# Patient Record
Sex: FEMALE | Race: WHITE | NOT HISPANIC OR LATINO
[De-identification: names, ages, dates, MRNs, and addresses within clinical notes are randomized per-mention and may not be internally consistent; named-entity substitution may affect disease eponyms.]

---

## 2018-03-23 ENCOUNTER — APPOINTMENT (OUTPATIENT)
Dept: GYNECOLOGIC ONCOLOGY | Facility: CLINIC | Age: 63
End: 2018-03-23
Payer: COMMERCIAL

## 2018-03-23 VITALS
SYSTOLIC BLOOD PRESSURE: 146 MMHG | WEIGHT: 175.5 LBS | HEIGHT: 56 IN | HEART RATE: 95 BPM | DIASTOLIC BLOOD PRESSURE: 99 MMHG | BODY MASS INDEX: 39.48 KG/M2 | OXYGEN SATURATION: 96 %

## 2018-03-23 DIAGNOSIS — M32.9 SYSTEMIC LUPUS ERYTHEMATOSUS, UNSPECIFIED: ICD-10-CM

## 2018-03-23 DIAGNOSIS — Z83.3 FAMILY HISTORY OF DIABETES MELLITUS: ICD-10-CM

## 2018-03-23 DIAGNOSIS — G43.909 MIGRAINE, UNSPECIFIED, NOT INTRACTABLE, W/OUT STATUS MIGRAINOSUS: ICD-10-CM

## 2018-03-23 DIAGNOSIS — Z82.0 FAMILY HISTORY OF EPILEPSY AND OTHER DISEASES OF THE NERVOUS SYSTEM: ICD-10-CM

## 2018-03-23 PROCEDURE — 99205 OFFICE O/P NEW HI 60 MIN: CPT

## 2018-03-28 ENCOUNTER — OUTPATIENT (OUTPATIENT)
Dept: OUTPATIENT SERVICES | Facility: HOSPITAL | Age: 63
LOS: 1 days | End: 2018-03-28
Payer: COMMERCIAL

## 2018-03-28 ENCOUNTER — RESULT REVIEW (OUTPATIENT)
Age: 63
End: 2018-03-28

## 2018-03-28 DIAGNOSIS — C51.9 MALIGNANT NEOPLASM OF VULVA, UNSPECIFIED: ICD-10-CM

## 2018-03-28 LAB — SURGICAL PATHOLOGY STUDY: SIGNIFICANT CHANGE UP

## 2018-03-28 PROCEDURE — 88321 CONSLTJ&REPRT SLD PREP ELSWR: CPT

## 2018-03-29 ENCOUNTER — APPOINTMENT (OUTPATIENT)
Dept: GYNECOLOGIC ONCOLOGY | Facility: CLINIC | Age: 63
End: 2018-03-29
Payer: COMMERCIAL

## 2018-03-29 VITALS
OXYGEN SATURATION: 97 % | HEIGHT: 56 IN | WEIGHT: 176.13 LBS | SYSTOLIC BLOOD PRESSURE: 146 MMHG | HEART RATE: 110 BPM | BODY MASS INDEX: 39.62 KG/M2 | DIASTOLIC BLOOD PRESSURE: 88 MMHG

## 2018-03-29 PROCEDURE — 99214 OFFICE O/P EST MOD 30 MIN: CPT

## 2018-04-03 ENCOUNTER — OUTPATIENT (OUTPATIENT)
Dept: OUTPATIENT SERVICES | Facility: HOSPITAL | Age: 63
LOS: 1 days | End: 2018-04-03
Payer: COMMERCIAL

## 2018-04-03 LAB — GLUCOSE BLDC GLUCOMTR-MCNC: 98 MG/DL — SIGNIFICANT CHANGE UP (ref 70–99)

## 2018-04-03 PROCEDURE — 82962 GLUCOSE BLOOD TEST: CPT

## 2018-04-03 PROCEDURE — 78815 PET IMAGE W/CT SKULL-THIGH: CPT | Mod: 26

## 2018-04-03 PROCEDURE — 78815 PET IMAGE W/CT SKULL-THIGH: CPT

## 2018-04-03 PROCEDURE — A9552: CPT

## 2018-04-06 RX ORDER — IBUPROFEN 800 MG/1
800 TABLET, FILM COATED ORAL 3 TIMES DAILY
Qty: 30 | Refills: 0 | Status: ACTIVE | COMMUNITY
Start: 2018-04-06 | End: 1900-01-01

## 2018-04-06 RX ORDER — DOCUSATE SODIUM 100 MG/1
100 CAPSULE ORAL
Qty: 60 | Refills: 0 | Status: ACTIVE | COMMUNITY
Start: 2018-04-06 | End: 1900-01-01

## 2018-04-09 ENCOUNTER — OUTPATIENT (OUTPATIENT)
Dept: OUTPATIENT SERVICES | Facility: HOSPITAL | Age: 63
LOS: 1 days | End: 2018-04-09
Payer: COMMERCIAL

## 2018-04-09 VITALS
SYSTOLIC BLOOD PRESSURE: 150 MMHG | RESPIRATION RATE: 18 BRPM | HEART RATE: 73 BPM | OXYGEN SATURATION: 98 % | WEIGHT: 173.06 LBS | TEMPERATURE: 97 F | HEIGHT: 59 IN | DIASTOLIC BLOOD PRESSURE: 68 MMHG

## 2018-04-09 DIAGNOSIS — Z98.891 HISTORY OF UTERINE SCAR FROM PREVIOUS SURGERY: Chronic | ICD-10-CM

## 2018-04-09 PROCEDURE — 78195 LYMPH SYSTEM IMAGING: CPT | Mod: 26

## 2018-04-09 PROCEDURE — 78195 LYMPH SYSTEM IMAGING: CPT

## 2018-04-09 PROCEDURE — A9541: CPT

## 2018-04-10 ENCOUNTER — RESULT REVIEW (OUTPATIENT)
Age: 63
End: 2018-04-10

## 2018-04-10 ENCOUNTER — APPOINTMENT (OUTPATIENT)
Dept: GYNECOLOGIC ONCOLOGY | Facility: HOSPITAL | Age: 63
End: 2018-04-10

## 2018-04-10 ENCOUNTER — INPATIENT (INPATIENT)
Facility: HOSPITAL | Age: 63
LOS: 2 days | Discharge: HOME HEALTH | DRG: 747 | End: 2018-04-13
Attending: OBSTETRICS & GYNECOLOGY | Admitting: OBSTETRICS & GYNECOLOGY
Payer: COMMERCIAL

## 2018-04-10 ENCOUNTER — TRANSCRIPTION ENCOUNTER (OUTPATIENT)
Age: 63
End: 2018-04-10

## 2018-04-10 DIAGNOSIS — Z98.891 HISTORY OF UTERINE SCAR FROM PREVIOUS SURGERY: Chronic | ICD-10-CM

## 2018-04-10 LAB
BLD GP AB SCN SERPL QL: NEGATIVE — SIGNIFICANT CHANGE UP
RH IG SCN BLD-IMP: POSITIVE — SIGNIFICANT CHANGE UP

## 2018-04-10 PROCEDURE — 38500 BIOPSY/REMOVAL LYMPH NODES: CPT

## 2018-04-10 PROCEDURE — 56630 VULVECTOMY RADICAL PARTIAL: CPT

## 2018-04-10 PROCEDURE — 38900 IO MAP OF SENT LYMPH NODE: CPT

## 2018-04-10 RX ORDER — OXYCODONE HYDROCHLORIDE 5 MG/1
5 TABLET ORAL EVERY 4 HOURS
Qty: 0 | Refills: 0 | Status: DISCONTINUED | OUTPATIENT
Start: 2018-04-10 | End: 2018-04-13

## 2018-04-10 RX ORDER — ENOXAPARIN SODIUM 100 MG/ML
40 INJECTION SUBCUTANEOUS DAILY
Qty: 0 | Refills: 0 | Status: DISCONTINUED | OUTPATIENT
Start: 2018-04-11 | End: 2018-04-11

## 2018-04-10 RX ORDER — ACETAMINOPHEN 500 MG
1000 TABLET ORAL EVERY 8 HOURS
Qty: 0 | Refills: 0 | Status: COMPLETED | OUTPATIENT
Start: 2018-04-10 | End: 2018-04-13

## 2018-04-10 RX ORDER — ACETAMINOPHEN 500 MG
650 TABLET ORAL ONCE
Qty: 0 | Refills: 0 | Status: DISCONTINUED | OUTPATIENT
Start: 2018-04-10 | End: 2018-04-10

## 2018-04-10 RX ORDER — OXYCODONE HYDROCHLORIDE 5 MG/1
5 TABLET ORAL EVERY 4 HOURS
Qty: 0 | Refills: 0 | Status: DISCONTINUED | OUTPATIENT
Start: 2018-04-10 | End: 2018-04-10

## 2018-04-10 RX ORDER — OXYCODONE AND ACETAMINOPHEN 5; 325 MG/1; MG/1
1 TABLET ORAL EVERY 4 HOURS
Qty: 0 | Refills: 0 | Status: DISCONTINUED | OUTPATIENT
Start: 2018-04-10 | End: 2018-04-10

## 2018-04-10 RX ORDER — DOCUSATE SODIUM 100 MG
100 CAPSULE ORAL THREE TIMES A DAY
Qty: 0 | Refills: 0 | Status: DISCONTINUED | OUTPATIENT
Start: 2018-04-10 | End: 2018-04-13

## 2018-04-10 RX ORDER — ONDANSETRON 8 MG/1
4 TABLET, FILM COATED ORAL ONCE
Qty: 0 | Refills: 0 | Status: DISCONTINUED | OUTPATIENT
Start: 2018-04-10 | End: 2018-04-10

## 2018-04-10 RX ORDER — ONDANSETRON 8 MG/1
8 TABLET, FILM COATED ORAL EVERY 8 HOURS
Qty: 0 | Refills: 0 | Status: DISCONTINUED | OUTPATIENT
Start: 2018-04-10 | End: 2018-04-13

## 2018-04-10 RX ORDER — ONDANSETRON 8 MG/1
4 TABLET, FILM COATED ORAL ONCE
Qty: 0 | Refills: 0 | Status: COMPLETED | OUTPATIENT
Start: 2018-04-10 | End: 2018-04-10

## 2018-04-10 RX ORDER — SODIUM CHLORIDE 9 MG/ML
1000 INJECTION, SOLUTION INTRAVENOUS
Qty: 0 | Refills: 0 | Status: DISCONTINUED | OUTPATIENT
Start: 2018-04-10 | End: 2018-04-10

## 2018-04-10 RX ORDER — KETOROLAC TROMETHAMINE 30 MG/ML
30 SYRINGE (ML) INJECTION EVERY 8 HOURS
Qty: 0 | Refills: 0 | Status: DISCONTINUED | OUTPATIENT
Start: 2018-04-10 | End: 2018-04-13

## 2018-04-10 RX ORDER — ACETAMINOPHEN 500 MG
1000 TABLET ORAL EVERY 8 HOURS
Qty: 0 | Refills: 0 | Status: DISCONTINUED | OUTPATIENT
Start: 2018-04-10 | End: 2018-04-10

## 2018-04-10 RX ORDER — KETOROLAC TROMETHAMINE 30 MG/ML
30 SYRINGE (ML) INJECTION ONCE
Qty: 0 | Refills: 0 | Status: DISCONTINUED | OUTPATIENT
Start: 2018-04-10 | End: 2018-04-10

## 2018-04-10 RX ORDER — METOCLOPRAMIDE HCL 10 MG
10 TABLET ORAL ONCE
Qty: 0 | Refills: 0 | Status: COMPLETED | OUTPATIENT
Start: 2018-04-10 | End: 2018-04-10

## 2018-04-10 RX ORDER — OXYCODONE AND ACETAMINOPHEN 5; 325 MG/1; MG/1
2 TABLET ORAL EVERY 6 HOURS
Qty: 0 | Refills: 0 | Status: DISCONTINUED | OUTPATIENT
Start: 2018-04-10 | End: 2018-04-10

## 2018-04-10 RX ORDER — HYDROMORPHONE HYDROCHLORIDE 2 MG/ML
0.5 INJECTION INTRAMUSCULAR; INTRAVENOUS; SUBCUTANEOUS ONCE
Qty: 0 | Refills: 0 | Status: DISCONTINUED | OUTPATIENT
Start: 2018-04-10 | End: 2018-04-10

## 2018-04-10 RX ORDER — OXYCODONE HYDROCHLORIDE 5 MG/1
10 TABLET ORAL EVERY 6 HOURS
Qty: 0 | Refills: 0 | Status: DISCONTINUED | OUTPATIENT
Start: 2018-04-10 | End: 2018-04-10

## 2018-04-10 RX ADMIN — HYDROMORPHONE HYDROCHLORIDE 0.5 MILLIGRAM(S): 2 INJECTION INTRAMUSCULAR; INTRAVENOUS; SUBCUTANEOUS at 14:27

## 2018-04-10 RX ADMIN — Medication 100 MILLIGRAM(S): at 20:55

## 2018-04-10 RX ADMIN — Medication 10 MILLIGRAM(S): at 14:27

## 2018-04-10 RX ADMIN — ONDANSETRON 4 MILLIGRAM(S): 8 TABLET, FILM COATED ORAL at 18:28

## 2018-04-10 RX ADMIN — Medication 30 MILLIGRAM(S): at 12:20

## 2018-04-10 RX ADMIN — Medication 1000 MILLIGRAM(S): at 17:39

## 2018-04-10 RX ADMIN — Medication 1000 MILLIGRAM(S): at 18:30

## 2018-04-10 RX ADMIN — OXYCODONE AND ACETAMINOPHEN 2 TABLET(S): 5; 325 TABLET ORAL at 13:20

## 2018-04-10 RX ADMIN — Medication 30 MILLIGRAM(S): at 21:10

## 2018-04-10 RX ADMIN — ONDANSETRON 8 MILLIGRAM(S): 8 TABLET, FILM COATED ORAL at 20:55

## 2018-04-10 RX ADMIN — Medication 30 MILLIGRAM(S): at 20:55

## 2018-04-10 NOTE — PROGRESS NOTE ADULT - SUBJECTIVE AND OBJECTIVE BOX
Patient evaluated at bedside. Pt notes pressure and swelling in vaginal/perineal area but denies any vaginal bleeding. Pt endorses nausea and an episode of emesis after having some water. Dnies nausea currently. Has not passed flatus as of yet. She denies headache, dizziness, chest pain, palpitations, shortness of breathe,or heavy vaginal bleeding.    Physical Exam:  Vital Signs Last 24 Hrs  T(C): 36.3 (10 Apr 2018 20:39), Max: 36.6 (10 Apr 2018 11:55)  T(F): 97.3 (10 Apr 2018 20:39), Max: 97.8 (10 Apr 2018 11:55)  HR: 91 (10 Apr 2018 20:39) (68 - 102)  BP: 113/74 (10 Apr 2018 20:39) (98/54 - 146/92)  BP(mean): 74 (10 Apr 2018 14:40) (74 - 113)  RR: 16 (10 Apr 2018 20:39) (16 - 24)  SpO2: 95% (10 Apr 2018 20:39) (95% - 99%)    GA: NAD, A+0 x 3  Abd: + BS, soft, nontender, nondistended, no rebound or guarding  : Incision clean, dry and intact w/ bacitracin, BHAVNA serosanguinous   Extremities: no swelling or calf tenderness    I&O's Detail    10 Apr 2018 07:01  -  10 Apr 2018 21:12  --------------------------------------------------------  IN:  Total IN: 0 mL    OUT:    Bulb: 10 mL  Total OUT: 10 mL    Total NET: -10 mL

## 2018-04-10 NOTE — PROGRESS NOTE ADULT - ASSESSMENT
63yo s/p radical partial vulvectomy with bilateral sentinal lymph node biopsy for squamous cell vulvar cancer POD #0. Pt is doing well.    Neuro: Tylenol 1g q 8hrs ATC, Toradol 30mg q 8hrs ATC, Oxycodon IR 5mg breakthrough. Zofran as needed for nausea  CV: Saline lock  Pulm: Sating well on RA  GI: Reg diet  : cheng reinserted due to failing TOV, remove cheng in AM  ID: bacitracin ointment on vulva   VTE ppx: SCDs, lovenox 40mg starting in the AM

## 2018-04-10 NOTE — DISCHARGE NOTE ADULT - PATIENT PORTAL LINK FT
You can access the [facility] Portal, offered by [facility], by registering with the following website: [email or web address]

## 2018-04-10 NOTE — PROGRESS NOTE ADULT - SUBJECTIVE AND OBJECTIVE BOX
MEDICATIONS  (STANDING):  acetaminophen   Tablet. 1000 milliGRAM(s) Oral every 8 hours  docusate sodium 100 milliGRAM(s) Oral three times a day  ketorolac   Injectable 30 milliGRAM(s) IV Push every 8 hours  ondansetron Injectable 4 milliGRAM(s) IV Push once  ondansetron Injectable 8 milliGRAM(s) IV Push every 8 hours    MEDICATIONS  (PRN):  oxyCODONE    IR 5 milliGRAM(s) Oral every 4 hours PRN Breakthrough pain only  Patient evaluated at bedside for postop check. No acute events. Pt notes pressure and swelling in vaginal/perineal area. Notes improvement with icepack. Has not yet had anything PO nor attempted ambulation. Has not passed flatus as of yet. She denies headache, dizziness, chest pain, palpitations, shortness of breathe,or heavy vaginal bleeding.    Physical Exam:  Vital Signs Last 24 Hrs  T(C): 35.6 (10 Apr 2018 16:57), Max: 36.6 (10 Apr 2018 11:55)  T(F): 96 (10 Apr 2018 16:57), Max: 97.8 (10 Apr 2018 11:55)  HR: 88 (10 Apr 2018 16:57) (68 - 102)  BP: 121/62 (10 Apr 2018 16:57) (98/54 - 146/92)  BP(mean): 74 (10 Apr 2018 14:40) (74 - 113)  RR: 16 (10 Apr 2018 16:57) (16 - 24)  SpO2: 96% (10 Apr 2018 16:57) (96% - 99%)    GA: NAD, A+0 x 3  Abd: + BS, soft, nontender, nondistended, no rebound or guarding  : Incision clean, dry and intact w/ bacitracin   Extremities: no swelling or calf tenderness    I&O's Detail    10 Apr 2018 07:01  -  10 Apr 2018 17:39  --------------------------------------------------------  IN:  Total IN: 0 mL    OUT:    Bulb: 10 mL  Total OUT: 10 mL    Total NET: -10 mL

## 2018-04-10 NOTE — PROGRESS NOTE ADULT - ASSESSMENT
61yo s/p radical partial vulvectomy with bilateral sentinal lymph node biopsy for squamous cell vulvar cancer POD #0. Pt is doing well.    Neuro: Tylenol 1g q 8hrs ATC, Toradol 30mg q 8hrs ATC, Oxycodon IR 5mg breakthrough  : awaiting TOV  VTE ppx: SCDs, lovenox 40mg starting in the AM  FEN: saline lock/regular diet 61yo s/p radical partial vulvectomy with bilateral sentinal lymph node biopsy for squamous cell vulvar cancer POD #0. Pt is doing well.    Neuro: Tylenol 1g q 8hrs ATC, Toradol 30mg q 8hrs ATC, Oxycodon IR 5mg breakthrough  CV: Saline lock  Pulm: Sating well on RA  GI: Reg diet  : awaiting TOV  ID: bacitracin ointment on vulva   VTE ppx: SCDs, lovenox 40mg starting in the AM

## 2018-04-10 NOTE — DISCHARGE NOTE ADULT - CARE PLAN
Principal Discharge DX:	Vulvar cancer  Goal:	Recovery  Assessment and plan of treatment:	Follow up with your GYN in 1- 2 weeks. Call the office to schedule or confirm your appointment  Regular diet. No heavy lifting. Pelvic rest for 6 weeks.  This includes no tampons, douching or intercourse. Call with any signs of symptoms of infection including fever > 100.4 degrees, severe pain, malodorous vaginal discharge or bleeding > 1 pad/hour. Principal Discharge DX:	Vulvar cancer  Goal:	Recovery  Assessment and plan of treatment:	Follow up with your GYN in 1- 2 weeks. Call the office to schedule or confirm your appointment  Regular diet. No heavy lifting. Pelvic rest for 6 weeks.  This includes no tampons, douching or intercourse. Call with any signs of symptoms of infection including fever > 100.4 degrees, severe pain, malodorous vaginal discharge or bleeding > 1 pad/hour.  Goal:	Cheng catheter  Assessment and plan of treatment:	Keep cheng in until you follow up with physician. Can empty urine from bag into the toilet.  Goal:	Drain care  Assessment and plan of treatment:	Home nurse will assist in drain care including emptying of drain bulb. Nurse to assess the drain site and record output from drain daily. Do not try to pull out the drain. Your physician will remove it on the day you follow up. Principal Discharge DX:	Vulvar cancer  Goal:	Recovery  Assessment and plan of treatment:	Follow up with Dr. Garcia in the office on Wed 4/18 @ 1030 AM at the [facility] - [address] [place] [address].   Regular diet. No heavy lifting. Pelvic rest for 6 weeks.  This includes no tampons, douching or intercourse. Call with any signs of symptoms of infection including fever > 100.4 degrees, severe pain, malodorous vaginal discharge or bleeding > 1 pad/hour. Please use the instructions printed for you from the office.  Goal:	Cheng catheter  Assessment and plan of treatment:	Keep cheng in until you follow up with physician. Can empty urine from bag into the toilet.  Goal:	Drain care  Assessment and plan of treatment:	Home nurse will assist in drain care including emptying of drain bulb. Nurse to assess the drain site and record output from drain daily. Do not try to pull out the drain. Your physician will remove it on the day you follow up. Principal Discharge DX:	Vulvar cancer  Goal:	Recovery  Assessment and plan of treatment:	Follow up with Dr. Garcia in the office on Wed 4/18 @ 1030 AM at the [facility] - [address] [place] [address].   Regular diet. No heavy lifting. Pelvic rest for 6 weeks.  This includes no tampons, douching or intercourse. Call with any signs of symptoms of infection including fever > 100.4 degrees, severe pain, malodorous vaginal discharge or bleeding > 1 pad/hour. Please use the instructions printed for you from the office.  Goal:	Cheng catheter  Assessment and plan of treatment:	Keep cheng in until you follow up with physician. Can empty urine from bag into the toilet.  Goal:	Drain care  Assessment and plan of treatment:	Home nurse will assist in drain care including emptying of drain bulb. Nurse to assess the drain site and record output from drain- will visit 2 times or as needed. Do not try to pull out the drain. Your physician will remove it on the day you follow up. Principal Discharge DX:	Vulvar cancer  Goal:	Recovery  Assessment and plan of treatment:	Follow up with Dr. Garcia in the office on Wed 4/18 @ 1030 AM at the [facility] - [address] [place] [address].   Regular diet. No heavy lifting. Pelvic rest for 6 weeks.  This includes no tampons, douching or intercourse. Call with any signs of symptoms of infection including fever > 100.4 degrees, severe pain, malodorous vaginal discharge or bleeding > 1 pad/hour. Please use the instructions printed for you from the office.  Goal:	Cheng catheter  Assessment and plan of treatment:	Keep cheng in until you follow up with physician. Can empty urine from bag into the toilet.  Goal:	Drain care  Assessment and plan of treatment:	Home nurse will assist in drain care including emptying of drain bulb. Nurse to assess the drain site and record output from drain- will visit 2 times or as needed. Do not try to pull out the drain. Your physician will remove it on the day you follow up.  Goal:	Wound care  Assessment and plan of treatment:	Please apply Bacitracin ointment (can purchase over the counter from pharmacy) to affected area 3 times per day. Continue sitz bath 3 times per day as well. Principal Discharge DX:	Vulvar cancer  Goal:	Recovery  Assessment and plan of treatment:	Follow up with Dr. Garcia in the office on Wed 4/18 @ 1030 AM at the [facility] - [address] [place] [address].   Regular diet. No heavy lifting. Pelvic rest for 6 weeks.  This includes no tampons, douching or intercourse. Call with any signs of symptoms of infection including fever > 100.4 degrees, severe pain, malodorous vaginal discharge or bleeding > 1 pad/hour. Please use the instructions printed for you from the office.  Goal:	Cheng catheter  Assessment and plan of treatment:	Keep cheng in until you follow up with physician. Can empty urine from bag into the toilet.  Goal:	Drain care  Assessment and plan of treatment:	Home nurse will assist in drain care including emptying of drain bulb. Nurse to assess the drain site and record output from drain- will visit 2 times or as needed. Do not try to pull out the drain. Your physician will remove it on the day you follow up.  Goal:	Wound care  Assessment and plan of treatment:	Please apply Bacitracin ointment (can purchase over the counter from pharmacy) to affected area 3 times per day. Continue sitz bath 3 times per day as well.  Goal:	Pain Control  Assessment and plan of treatment:	Over the counter Motrin or Tylenol for pain

## 2018-04-10 NOTE — DISCHARGE NOTE ADULT - CARE PROVIDER_API CALL
Grace Garcia (MD), Obstetrics and Gynecology  [address] [place] [address]  Phone: [phone]  Fax: [phone]

## 2018-04-10 NOTE — DISCHARGE NOTE ADULT - PLAN OF CARE
Recovery Follow up with your GYN in 1- 2 weeks. Call the office to schedule or confirm your appointment  Regular diet. No heavy lifting. Pelvic rest for 6 weeks.  This includes no tampons, douching or intercourse. Call with any signs of symptoms of infection including fever > 100.4 degrees, severe pain, malodorous vaginal discharge or bleeding > 1 pad/hour. Wilkinson catheter Keep cheng in until you follow up with physician. Can empty urine from bag into the toilet. Drain care Home nurse will assist in drain care including emptying of drain bulb. Nurse to assess the drain site and record output from drain daily. Do not try to pull out the drain. Your physician will remove it on the day you follow up. Follow up with Dr. Garcia in the office on Wed 4/18 @ 1030 AM at the [facility] - [address] [place] [address].   Regular diet. No heavy lifting. Pelvic rest for 6 weeks.  This includes no tampons, douching or intercourse. Call with any signs of symptoms of infection including fever > 100.4 degrees, severe pain, malodorous vaginal discharge or bleeding > 1 pad/hour. Please use the instructions printed for you from the office. Home nurse will assist in drain care including emptying of drain bulb. Nurse to assess the drain site and record output from drain- will visit 2 times or as needed. Do not try to pull out the drain. Your physician will remove it on the day you follow up. Wound care Please apply Bacitracin ointment (can purchase over the counter from pharmacy) to affected area 3 times per day. Continue sitz bath 3 times per day as well. Pain Control Over the counter Motrin or Tylenol for pain

## 2018-04-11 LAB
HCT VFR BLD CALC: 36.9 % — SIGNIFICANT CHANGE UP (ref 34.5–45)
HGB BLD-MCNC: 11.7 G/DL — SIGNIFICANT CHANGE UP (ref 11.5–15.5)
MCHC RBC-ENTMCNC: 29 PG — SIGNIFICANT CHANGE UP (ref 27–34)
MCHC RBC-ENTMCNC: 31.7 G/DL — LOW (ref 32–36)
MCV RBC AUTO: 91.6 FL — SIGNIFICANT CHANGE UP (ref 80–100)
PLATELET # BLD AUTO: 292 K/UL — SIGNIFICANT CHANGE UP (ref 150–400)
RBC # BLD: 4.03 M/UL — SIGNIFICANT CHANGE UP (ref 3.8–5.2)
RBC # FLD: 13.5 % — SIGNIFICANT CHANGE UP (ref 10.3–16.9)
WBC # BLD: 12 K/UL — HIGH (ref 3.8–10.5)
WBC # FLD AUTO: 12 K/UL — HIGH (ref 3.8–10.5)

## 2018-04-11 RX ORDER — ENOXAPARIN SODIUM 100 MG/ML
40 INJECTION SUBCUTANEOUS DAILY
Qty: 0 | Refills: 0 | Status: DISCONTINUED | OUTPATIENT
Start: 2018-04-12 | End: 2018-04-13

## 2018-04-11 RX ORDER — ENOXAPARIN SODIUM 100 MG/ML
40 INJECTION SUBCUTANEOUS DAILY
Qty: 0 | Refills: 0 | Status: DISCONTINUED | OUTPATIENT
Start: 2018-04-11 | End: 2018-04-11

## 2018-04-11 RX ADMIN — ONDANSETRON 8 MILLIGRAM(S): 8 TABLET, FILM COATED ORAL at 05:49

## 2018-04-11 RX ADMIN — Medication 30 MILLIGRAM(S): at 05:50

## 2018-04-11 RX ADMIN — Medication 30 MILLIGRAM(S): at 06:06

## 2018-04-11 RX ADMIN — Medication 1000 MILLIGRAM(S): at 21:20

## 2018-04-11 RX ADMIN — ENOXAPARIN SODIUM 40 MILLIGRAM(S): 100 INJECTION SUBCUTANEOUS at 12:05

## 2018-04-11 RX ADMIN — Medication 100 MILLIGRAM(S): at 20:53

## 2018-04-11 RX ADMIN — Medication 1000 MILLIGRAM(S): at 13:44

## 2018-04-11 RX ADMIN — Medication 1000 MILLIGRAM(S): at 20:53

## 2018-04-11 RX ADMIN — Medication 100 MILLIGRAM(S): at 05:49

## 2018-04-11 RX ADMIN — Medication 30 MILLIGRAM(S): at 14:00

## 2018-04-11 RX ADMIN — Medication 30 MILLIGRAM(S): at 21:10

## 2018-04-11 RX ADMIN — Medication 1000 MILLIGRAM(S): at 05:49

## 2018-04-11 RX ADMIN — Medication 30 MILLIGRAM(S): at 20:53

## 2018-04-11 RX ADMIN — Medication 100 MILLIGRAM(S): at 13:44

## 2018-04-11 RX ADMIN — Medication 1000 MILLIGRAM(S): at 06:20

## 2018-04-11 RX ADMIN — Medication 30 MILLIGRAM(S): at 13:44

## 2018-04-11 RX ADMIN — ONDANSETRON 8 MILLIGRAM(S): 8 TABLET, FILM COATED ORAL at 20:53

## 2018-04-11 RX ADMIN — ONDANSETRON 8 MILLIGRAM(S): 8 TABLET, FILM COATED ORAL at 13:44

## 2018-04-11 RX ADMIN — OXYCODONE HYDROCHLORIDE 5 MILLIGRAM(S): 5 TABLET ORAL at 23:52

## 2018-04-11 RX ADMIN — Medication 1000 MILLIGRAM(S): at 14:44

## 2018-04-11 NOTE — PROGRESS NOTE ADULT - ASSESSMENT
61yo s/p radical partial vulvectomy with bilateral sentinal lymph node biopsy for squamous cell vulvar cancer POD #1. Pt is doing well.    Neuro: Tylenol 1g q 8hrs ATC, Toradol 30mg q 8hrs ATC, Oxycodon IR 5mg breakthrough. Zofran as needed for nausea  CV: Saline lock  Pulm: Sating well on RA  GI: Reg diet  : removed this AM; TOV  ID: bacitracin ointment on vulva   VTE ppx: SCDs, lovenox 40mg

## 2018-04-11 NOTE — PROGRESS NOTE ADULT - SUBJECTIVE AND OBJECTIVE BOX
Patient evaluated at bedside. She's been unable to void. Wilkinson re-inserted. She has been out of bed to chair, tolerating regular diet. She denies headache, dizziness, chest pain, palpitations, shortness of breath, nausea, vomiting or heavy vaginal bleeding.      Physical Exam:  Vital Signs Last 24 Hrs  T(C): 36.7 (11 Apr 2018 09:10), Max: 36.8 (10 Apr 2018 23:28)  T(F): 98 (11 Apr 2018 09:10), Max: 98.2 (10 Apr 2018 23:28)  HR: 92 (11 Apr 2018 09:10) (77 - 99)  BP: 120/56 (11 Apr 2018 09:10) (104/65 - 121/62)  BP(mean): --  RR: 15 (11 Apr 2018 09:10) (15 - 16)  SpO2: 96% (11 Apr 2018 09:10) (95% - 96%)    GA: NAD, A+0 x 3  Abd: + BS, soft, nontender, nondistended, no rebound or guarding,   Incision clean, dry and intact b/l BHAVNA in R inguinal region   : lochia WNL  Extremities: no swelling or calf tenderness                            11.7   12.0  )-----------( 292      ( 11 Apr 2018 07:21 )             36.9         MEDICATIONS  (STANDING):  acetaminophen   Tablet. 1000 milliGRAM(s) Oral every 8 hours  docusate sodium 100 milliGRAM(s) Oral three times a day  enoxaparin Injectable 40 milliGRAM(s) SubCutaneous daily  ketorolac   Injectable 30 milliGRAM(s) IV Push every 8 hours  ondansetron Injectable 8 milliGRAM(s) IV Push every 8 hours    MEDICATIONS  (PRN):  oxyCODONE    IR 5 milliGRAM(s) Oral every 4 hours PRN Breakthrough pain only

## 2018-04-11 NOTE — PROGRESS NOTE ADULT - ASSESSMENT
61yo s/p radical partial vulvectomy with bilateral sentinal lymph node biopsy for squamous cell vulvar cancer POD #1. Pt is doing well.    Neuro: Tylenol 1g q 8hrs ATC, Toradol 30mg q 8hrs ATC, Oxycodon IR 5mg breakthrough. Zofran as needed for nausea  CV: Saline lock  Pulm: Sating well on RA  GI: Reg diet  : removed this AM; TOV failed. Wilkinson to stay in place and remove again in AM  ID: bacitracin ointment on vulva   VTE ppx: SCDs, lovenox 40mg

## 2018-04-11 NOTE — PROGRESS NOTE ADULT - ASSESSMENT
61yo s/p radical partial vulvectomy with bilateral sentinal lymph node biopsy for squamous cell vulvar cancer POD #1. Pt is doing well.    Neuro: Tylenol 1g q 8hrs ATC, Toradol 30mg q 8hrs ATC, Oxycodon IR 5mg breakthrough. Zofran as needed for nausea  CV: Saline lock  Pulm: Sating well on RA  GI: Reg diet  : Wilkinson to be removed again in AM  ID: bacitracin ointment on vulva   VTE ppx: SCDs, lovenox 40mg

## 2018-04-11 NOTE — PROGRESS NOTE ADULT - SUBJECTIVE AND OBJECTIVE BOX
Patient evaluated at bedside. No acute events overnight. Notes an episode of N/V x1, now resolved She reports pain is controlled put continues to complain of pressure. Adequate urine output. Wilkinson out this AM. She felt lightheaded when in chair this AM. She denies headache, dizziness, chest pain, palpitations, shortness of breathe, heavy vaginal bleeding.      Physical Exam:  Vital Signs Last 24 Hrs  T(C): 36.3 (11 Apr 2018 05:00), Max: 36.8 (10 Apr 2018 23:28)  T(F): 97.4 (11 Apr 2018 05:00), Max: 98.2 (10 Apr 2018 23:28)  HR: 99 (11 Apr 2018 05:00) (68 - 102)  BP: 113/71 (11 Apr 2018 05:00) (98/54 - 146/92)  BP(mean): 74 (10 Apr 2018 14:40) (74 - 113)  RR: 16 (11 Apr 2018 05:00) (16 - 24)  SpO2: 95% (11 Apr 2018 05:00) (95% - 99%)    GA: NAD, A+0 x 3  Abd: + BS, soft, nontender, nondistended, no rebound or guarding  Incision clean, dry and intact  w/ dermabond BHAVNA on right  : no blood on katheryn and scant amount on pad  Extremities: no swelling or calf tenderness      I&O's Detail    10 Apr 2018 07:01  -  11 Apr 2018 07:00  --------------------------------------------------------  IN:  Total IN: 0 mL    OUT:    Bulb: 40 mL    Indwelling Catheter - Urethral: 800 mL  Total OUT: 840 mL    Total NET: -840 mL    MEDICATIONS  (STANDING):  acetaminophen   Tablet. 1000 milliGRAM(s) Oral every 8 hours  docusate sodium 100 milliGRAM(s) Oral three times a day  enoxaparin Injectable 40 milliGRAM(s) SubCutaneous daily  ketorolac   Injectable 30 milliGRAM(s) IV Push every 8 hours  ondansetron Injectable 8 milliGRAM(s) IV Push every 8 hours    MEDICATIONS  (PRN):  oxyCODONE    IR 5 milliGRAM(s) Oral every 4 hours PRN Breakthrough pain only

## 2018-04-11 NOTE — PROGRESS NOTE ADULT - SUBJECTIVE AND OBJECTIVE BOX
Patient evaluated at bedside. She's has cheng. She has been out of bed to chair, tolerating regular diet. She denies headache, dizziness, chest pain, palpitations, shortness of breath, nausea, vomiting or heavy vaginal bleeding. But endorses buttock pain due to stitches       Physical Exam:  Vital Signs Last 24 Hrs  T(C): 37.2 (11 Apr 2018 20:14), Max: 37.2 (11 Apr 2018 20:14)  T(F): 98.9 (11 Apr 2018 20:14), Max: 98.9 (11 Apr 2018 20:14)  HR: 71 (11 Apr 2018 20:14) (71 - 99)  BP: 141/82 (11 Apr 2018 20:14) (113/71 - 141/82)  BP(mean): --  RR: 16 (11 Apr 2018 20:14) (15 - 16)  SpO2: 95% (11 Apr 2018 20:14) (95% - 96%)    GA: NAD, A+0 x 3  Abd: + BS, soft, nontender, nondistended, no rebound or guarding,   Incision clean, dry and intact b/l BHAVNA in R inguinal region   : lochia WNL  Extremities: no swelling or calf tenderness                            11.7   12.0  )-----------( 292      ( 11 Apr 2018 07:21 )             36.9         MEDICATIONS  (STANDING):  acetaminophen   Tablet. 1000 milliGRAM(s) Oral every 8 hours  docusate sodium 100 milliGRAM(s) Oral three times a day  enoxaparin Injectable 40 milliGRAM(s) SubCutaneous daily  ketorolac   Injectable 30 milliGRAM(s) IV Push every 8 hours  ondansetron Injectable 8 milliGRAM(s) IV Push every 8 hours    MEDICATIONS  (PRN):  oxyCODONE    IR 5 milliGRAM(s) Oral every 4 hours PRN Breakthrough pain only

## 2018-04-12 LAB
HCT VFR BLD CALC: 37.4 % — SIGNIFICANT CHANGE UP (ref 34.5–45)
HGB BLD-MCNC: 11.6 G/DL — SIGNIFICANT CHANGE UP (ref 11.5–15.5)
MCHC RBC-ENTMCNC: 29.1 PG — SIGNIFICANT CHANGE UP (ref 27–34)
MCHC RBC-ENTMCNC: 31 G/DL — LOW (ref 32–36)
MCV RBC AUTO: 94 FL — SIGNIFICANT CHANGE UP (ref 80–100)
PLATELET # BLD AUTO: 293 K/UL — SIGNIFICANT CHANGE UP (ref 150–400)
RBC # BLD: 3.98 M/UL — SIGNIFICANT CHANGE UP (ref 3.8–5.2)
RBC # FLD: 13.9 % — SIGNIFICANT CHANGE UP (ref 10.3–16.9)
WBC # BLD: 7.7 K/UL — SIGNIFICANT CHANGE UP (ref 3.8–10.5)
WBC # FLD AUTO: 7.7 K/UL — SIGNIFICANT CHANGE UP (ref 3.8–10.5)

## 2018-04-12 RX ORDER — MAGNESIUM HYDROXIDE 400 MG/1
30 TABLET, CHEWABLE ORAL DAILY
Qty: 0 | Refills: 0 | Status: DISCONTINUED | OUTPATIENT
Start: 2018-04-12 | End: 2018-04-13

## 2018-04-12 RX ORDER — BACITRACIN ZINC 500 UNIT/G
1 OINTMENT IN PACKET (EA) TOPICAL THREE TIMES A DAY
Qty: 0 | Refills: 0 | Status: DISCONTINUED | OUTPATIENT
Start: 2018-04-12 | End: 2018-04-13

## 2018-04-12 RX ADMIN — Medication 100 MILLIGRAM(S): at 06:59

## 2018-04-12 RX ADMIN — Medication 30 MILLIGRAM(S): at 13:59

## 2018-04-12 RX ADMIN — Medication 1000 MILLIGRAM(S): at 14:59

## 2018-04-12 RX ADMIN — Medication 1000 MILLIGRAM(S): at 21:03

## 2018-04-12 RX ADMIN — Medication 1000 MILLIGRAM(S): at 13:59

## 2018-04-12 RX ADMIN — Medication 30 MILLIGRAM(S): at 14:15

## 2018-04-12 RX ADMIN — Medication 1000 MILLIGRAM(S): at 07:30

## 2018-04-12 RX ADMIN — Medication 1 APPLICATION(S): at 14:47

## 2018-04-12 RX ADMIN — Medication 30 MILLIGRAM(S): at 05:45

## 2018-04-12 RX ADMIN — Medication 30 MILLIGRAM(S): at 21:49

## 2018-04-12 RX ADMIN — Medication 30 MILLIGRAM(S): at 21:03

## 2018-04-12 RX ADMIN — Medication 1000 MILLIGRAM(S): at 06:59

## 2018-04-12 RX ADMIN — Medication 1 APPLICATION(S): at 21:03

## 2018-04-12 RX ADMIN — Medication 30 MILLIGRAM(S): at 06:00

## 2018-04-12 RX ADMIN — Medication 100 MILLIGRAM(S): at 21:04

## 2018-04-12 RX ADMIN — Medication 100 MILLIGRAM(S): at 14:00

## 2018-04-12 RX ADMIN — ONDANSETRON 8 MILLIGRAM(S): 8 TABLET, FILM COATED ORAL at 21:02

## 2018-04-12 RX ADMIN — MAGNESIUM HYDROXIDE 30 MILLILITER(S): 400 TABLET, CHEWABLE ORAL at 19:10

## 2018-04-12 RX ADMIN — ONDANSETRON 8 MILLIGRAM(S): 8 TABLET, FILM COATED ORAL at 05:45

## 2018-04-12 RX ADMIN — ONDANSETRON 8 MILLIGRAM(S): 8 TABLET, FILM COATED ORAL at 14:00

## 2018-04-12 RX ADMIN — Medication 1000 MILLIGRAM(S): at 21:49

## 2018-04-12 RX ADMIN — OXYCODONE HYDROCHLORIDE 5 MILLIGRAM(S): 5 TABLET ORAL at 00:30

## 2018-04-12 RX ADMIN — ENOXAPARIN SODIUM 40 MILLIGRAM(S): 100 INJECTION SUBCUTANEOUS at 12:39

## 2018-04-12 NOTE — PROGRESS NOTE ADULT - SUBJECTIVE AND OBJECTIVE BOX
Patient evaluated at bedside. She reports perineal pressure. She has been ambulating without assistance, passing gas, tolerating regular diet. Will have TOV this AM. She denies headache, dizziness, chest pain, palpitations, shortness of breath, nausea, vomiting or heavy vaginal bleeding.    Vital Signs Last 24 Hrs  T(C): 36.8 (12 Apr 2018 05:14), Max: 37.2 (11 Apr 2018 20:14)  T(F): 98.2 (12 Apr 2018 05:14), Max: 98.9 (11 Apr 2018 20:14)  HR: 82 (12 Apr 2018 05:14) (71 - 96)  BP: 112/70 (12 Apr 2018 05:14) (112/70 - 141/82)  BP(mean): --  RR: 16 (12 Apr 2018 05:14) (15 - 16)  SpO2: 96% (12 Apr 2018 05:14) (95% - 96%)    GA: NAD, A+0 x 3  Abd: + BS, soft, nontender, nondistended, no rebound or guarding  Incision b/i inguinal region - clean, dry and intact w/ right sided BHAVNA  : mild swelling at perineum with small amount of bruising  Extremities: no swelling or calf tenderness    I&O's Detail    11 Apr 2018 07:01  -  12 Apr 2018 07:00  --------------------------------------------------------  IN:    Oral Fluid: 540 mL  Total IN: 540 mL    OUT:    Bulb: 42 mL    Indwelling Catheter - Urethral: 2100 mL    Voided: 350 mL  Total OUT: 2492 mL    Total NET: -1952 mL                                11.7   12.0  )-----------( 292      ( 11 Apr 2018 07:21 )             36.9       MEDICATIONS  (STANDING):  acetaminophen   Tablet. 1000 milliGRAM(s) Oral every 8 hours  docusate sodium 100 milliGRAM(s) Oral three times a day  enoxaparin Injectable 40 milliGRAM(s) SubCutaneous daily  ketorolac   Injectable 30 milliGRAM(s) IV Push every 8 hours  ondansetron Injectable 8 milliGRAM(s) IV Push every 8 hours    MEDICATIONS  (PRN):  oxyCODONE    IR 5 milliGRAM(s) Oral every 4 hours PRN Breakthrough pain only

## 2018-04-12 NOTE — PROGRESS NOTE ADULT - SUBJECTIVE AND OBJECTIVE BOX
Pt seen and examined at bedside. Pt states perineam pain consistent but sitting on cushion pad helps. Pt is ambulating, passing flatus. She had some reg food today. She states she did sitz bath twice today. Failed TOV again and therefore will be DC with cheng tomorrow    Pt denies fever, chills, chest pain, SOB, vomiting, lightheadedness, or dizziness.      Vital Signs Last 24 Hrs  T(C): 36.6 (12 Apr 2018 16:05), Max: 36.8 (12 Apr 2018 05:14)  T(F): 97.9 (12 Apr 2018 16:05), Max: 98.2 (12 Apr 2018 05:14)  HR: 96 (12 Apr 2018 16:05) (75 - 96)  BP: 133/69 (12 Apr 2018 16:05) (112/70 - 144/68)  BP(mean): --  RR: 16 (12 Apr 2018 16:05) (15 - 16)  SpO2: 95% (12 Apr 2018 16:05) (95% - 96%)    I&O's Detail    12 Apr 2018 07:01  -  12 Apr 2018 21:39  --------------------------------------------------------  IN:    Oral Fluid: 220 mL  Total IN: 220 mL    OUT:  Total OUT: 0 mL    Total NET: 220 mL          MEDICATIONS  (STANDING):  acetaminophen   Tablet. 1000 milliGRAM(s) Oral every 8 hours  BACItracin   Ointment 1 Application(s) Topical three times a day  docusate sodium 100 milliGRAM(s) Oral three times a day  enoxaparin Injectable 40 milliGRAM(s) SubCutaneous daily  ketorolac   Injectable 30 milliGRAM(s) IV Push every 8 hours  ondansetron Injectable 8 milliGRAM(s) IV Push every 8 hours    MEDICATIONS  (PRN):  oxyCODONE    IR 5 milliGRAM(s) Oral every 4 hours PRN Breakthrough pain only    Physical Exam:  GA: NAD  Abd: + BS, soft, nontender, nondistended, no rebound or guarding  Incision b/l inguinal region - clean, dry and intact w/ right BHAVNA drain- 10cc serosanguinous fluid in bulb  : mild swelling at perineum with small amount of bruising  Extremities: no swelling or calf tenderness    LABS:                        11.6   7.7   )-----------( 293      ( 12 Apr 2018 10:49 )             37.4

## 2018-04-12 NOTE — PROGRESS NOTE ADULT - SUBJECTIVE AND OBJECTIVE BOX
Pt seen and examined at bedside. Pt states perineam pain mildly improved from this morning and sitting on cushion pad helps. Pt is ambulating, passing flatus. She had some reg food today and felt a little nauseous. She states she did sitz bath twice today and felt urine "trickling" while she was on the sitz bath but was not able to void "a lot."    Pt denies fever, chills, chest pain, SOB, vomiting, lightheadedness, or dizziness.      T(F): 97.7 (04-12-18 @ 09:25), Max: 98.9 (04-11-18 @ 20:14)  HR: 75 (04-12-18 @ 09:25) (71 - 96)  BP: 144/68 (04-12-18 @ 09:25) (112/70 - 144/68)  RR: 15 (04-12-18 @ 09:25) (15 - 16)  SpO2: 96% (04-12-18 @ 09:25) (95% - 96%)  Wt(kg): --  I&O's Summary    11 Apr 2018 07:01  -  12 Apr 2018 07:00  --------------------------------------------------------  IN: 540 mL / OUT: 2492 mL / NET: -1952 mL    12 Apr 2018 07:01  -  12 Apr 2018 14:37  --------------------------------------------------------  IN: 220 mL / OUT: 0 mL / NET: 220 mL    MEDICATIONS  (STANDING):  acetaminophen   Tablet. 1000 milliGRAM(s) Oral every 8 hours  BACItracin   Ointment 1 Application(s) Topical three times a day  docusate sodium 100 milliGRAM(s) Oral three times a day  enoxaparin Injectable 40 milliGRAM(s) SubCutaneous daily  ketorolac   Injectable 30 milliGRAM(s) IV Push every 8 hours  ondansetron Injectable 8 milliGRAM(s) IV Push every 8 hours    MEDICATIONS  (PRN):  oxyCODONE    IR 5 milliGRAM(s) Oral every 4 hours PRN Breakthrough pain only    Physical Exam:  GA: NAD  Abd: + BS, soft, nontender, nondistended, no rebound or guarding  Incision b/l inguinal region - clean, dry and intact w/ right BHAVNA drain- 10cc serosanguinous fluid in bulb  : mild swelling at perineum with small amount of bruising  Extremities: no swelling or calf tenderness    LABS:                        11.6   7.7   )-----------( 293      ( 12 Apr 2018 10:49 )             37.4

## 2018-04-12 NOTE — PROGRESS NOTE ADULT - ASSESSMENT
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 Pt complains of mild perineal pain. Cheng was taken out 7AM. Pt has not adequately voided yet.     Neuro: Tylenol 1g q 8hrs ATC, Toradol 30mg q 8hrs ATC, Oxycodone IR 5mg breakthrough  CV: Saline lock  Pulm: Sating well on RA  GI: Reg diet, Zofran as needed for nausea  : s/p cheng- TOV. Sitz bath 3 times per day  ID: bacitracin ointment on vulva 3 times per day   VTE ppx: SCDs, lovenox 40mg

## 2018-04-12 NOTE — PROGRESS NOTE ADULT - ASSESSMENT
61yo POD2 s/p radical partial vulvectomy with bilateral sentinal lymph node biopsy for squamous cell vulvar cancer. Pt complains of mild perineal pain. TOV failed today. Will be DC with cheng tomorrow    Neuro: Tylenol 1g q 8hrs ATC, Toradol 30mg q 8hrs ATC, Oxycodone IR 5mg breakthrough  CV: Saline lock  Pulm: Sating well on RA  GI: Reg diet, Zofran as needed for nausea  : Cheng  ID: bacitracin ointment on vulva 3 times per day   VTE ppx: SCDs, lovenox 40mg

## 2018-04-12 NOTE — PROGRESS NOTE ADULT - ATTENDING COMMENTS
Patient reports worsening perineum pain beginning around 4 am. Likely 2' to dissipation of local anesthetic. Improved with oxycodone. Perineum examined, Wound intact. Edema but no erythema, ecchymosis same as yesterday.     Out of bed encouraged. Will start sitz baths today. Likely d/c this afternoon if pain improves. Emotional support given.     Awaiting void

## 2018-04-12 NOTE — PROGRESS NOTE ADULT - ASSESSMENT
61yo s/p radical partial vulvectomy with bilateral sentinal lymph node biopsy for squamous cell vulvar cancer POD #2. Pt is doing well but continues to c/o of perineal pressure. TOV this AM.     Neuro: Tylenol 1g q 8hrs ATC, Toradol 30mg q 8hrs ATC, Oxycodon IR 5mg breakthrough. Zofran as needed for nausea  CV: Saline lock  Pulm: Sating well on RA  GI: Reg diet  : Wilkinson to be removed again in AM; pt will be for TOV  ID: bacitracin ointment on vulva   VTE ppx: SCDs, lovenox 40mg

## 2018-04-13 VITALS
OXYGEN SATURATION: 97 % | RESPIRATION RATE: 15 BRPM | DIASTOLIC BLOOD PRESSURE: 78 MMHG | TEMPERATURE: 96 F | HEART RATE: 82 BPM | SYSTOLIC BLOOD PRESSURE: 122 MMHG

## 2018-04-13 PROCEDURE — 88309 TISSUE EXAM BY PATHOLOGIST: CPT

## 2018-04-13 PROCEDURE — 86901 BLOOD TYPING SEROLOGIC RH(D): CPT

## 2018-04-13 PROCEDURE — 36415 COLL VENOUS BLD VENIPUNCTURE: CPT

## 2018-04-13 PROCEDURE — 88304 TISSUE EXAM BY PATHOLOGIST: CPT

## 2018-04-13 PROCEDURE — 86850 RBC ANTIBODY SCREEN: CPT

## 2018-04-13 PROCEDURE — 88305 TISSUE EXAM BY PATHOLOGIST: CPT

## 2018-04-13 PROCEDURE — 86900 BLOOD TYPING SEROLOGIC ABO: CPT

## 2018-04-13 PROCEDURE — 85027 COMPLETE CBC AUTOMATED: CPT

## 2018-04-13 RX ADMIN — ENOXAPARIN SODIUM 40 MILLIGRAM(S): 100 INJECTION SUBCUTANEOUS at 11:55

## 2018-04-13 RX ADMIN — Medication 100 MILLIGRAM(S): at 06:13

## 2018-04-13 RX ADMIN — Medication 30 MILLIGRAM(S): at 14:14

## 2018-04-13 RX ADMIN — Medication 30 MILLIGRAM(S): at 06:13

## 2018-04-13 RX ADMIN — Medication 30 MILLIGRAM(S): at 07:00

## 2018-04-13 RX ADMIN — Medication 1 APPLICATION(S): at 07:00

## 2018-04-13 RX ADMIN — Medication 1000 MILLIGRAM(S): at 07:00

## 2018-04-13 RX ADMIN — Medication 1000 MILLIGRAM(S): at 06:13

## 2018-04-13 RX ADMIN — Medication 100 MILLIGRAM(S): at 14:14

## 2018-04-13 RX ADMIN — MAGNESIUM HYDROXIDE 30 MILLILITER(S): 400 TABLET, CHEWABLE ORAL at 09:53

## 2018-04-13 RX ADMIN — Medication 1000 MILLIGRAM(S): at 14:14

## 2018-04-13 NOTE — PROGRESS NOTE ADULT - ASSESSMENT
61yo POD3 s/p radical partial vulvectomy with bilateral sentinal lymph node biopsy for squamous cell vulvar cancer. Pt complains of mild perineal pain. TOV failed today. Will be DC with prosper tomorrow    Neuro: Tylenol 1g q 8hrs ATC, Toradol 30mg q 8hrs ATC, Oxycodone IR 5mg breakthrough  CV: Saline lock  Pulm: Sating well on RA  GI: Reg diet, Zofran as needed for nausea  : Wilkinson  ID: bacitracin ointment on vulva 3 times per day   VTE ppx: SCDs, lovenox 40mg    Pt going home with BHAVNA and prosper. Will follow up with Dr. Garcia in the office on Wed 4/18 @ 1030 AM at the NEW OFFICE - [address] [place] [address].

## 2018-04-13 NOTE — PROGRESS NOTE ADULT - SUBJECTIVE AND OBJECTIVE BOX
Patient evaluated at bedside. She reports pain/pressure is well controlled. She has been ambulating without assistance, passing gas, +BM tolerating regular diet. She denies headache, dizziness, chest pain, palpitations, shortness of breath, nausea, vomiting or heavy vaginal bleeding.      Physical Exam:  Vital Signs Last 24 Hrs  T(C): 37.5 (13 Apr 2018 05:26), Max: 37.5 (13 Apr 2018 05:26)  T(F): 99.5 (13 Apr 2018 05:26), Max: 99.5 (13 Apr 2018 05:26)  HR: 86 (13 Apr 2018 05:26) (75 - 96)  BP: 135/75 (13 Apr 2018 05:26) (133/69 - 144/68)  BP(mean): --  RR: 17 (13 Apr 2018 05:26) (15 - 17)  SpO2: 98% (13 Apr 2018 05:26) (95% - 98%)    GA: NAD, A+0 x 3  Abd: + BS, soft, nontender, nondistended, no rebound or guarding  Incision clean, dry and intact b/l BHAVNA in place  Extremities: no swelling or calf tenderness                            11.6   7.7   )-----------( 293      ( 12 Apr 2018 10:49 )             37.4

## 2018-04-16 ENCOUNTER — EMERGENCY (EMERGENCY)
Facility: HOSPITAL | Age: 63
LOS: 1 days | Discharge: HOME | End: 2018-04-16
Attending: EMERGENCY MEDICINE | Admitting: EMERGENCY MEDICINE
Payer: COMMERCIAL

## 2018-04-16 VITALS
HEIGHT: 59 IN | HEART RATE: 103 BPM | OXYGEN SATURATION: 97 % | WEIGHT: 169.98 LBS | RESPIRATION RATE: 22 BRPM | TEMPERATURE: 98 F | SYSTOLIC BLOOD PRESSURE: 146 MMHG | DIASTOLIC BLOOD PRESSURE: 82 MMHG

## 2018-04-16 DIAGNOSIS — N39.0 URINARY TRACT INFECTION, SITE NOT SPECIFIED: ICD-10-CM

## 2018-04-16 DIAGNOSIS — T83.038A LEAKAGE OF OTHER URINARY CATHETER, INITIAL ENCOUNTER: ICD-10-CM

## 2018-04-16 DIAGNOSIS — Z98.891 HISTORY OF UTERINE SCAR FROM PREVIOUS SURGERY: Chronic | ICD-10-CM

## 2018-04-16 PROCEDURE — 99283 EMERGENCY DEPT VISIT LOW MDM: CPT | Mod: 25

## 2018-04-16 RX ORDER — OXYBUTYNIN CHLORIDE 5 MG
5 TABLET ORAL ONCE
Qty: 0 | Refills: 0 | Status: COMPLETED | OUTPATIENT
Start: 2018-04-16 | End: 2018-04-16

## 2018-04-16 RX ADMIN — Medication 5 MILLIGRAM(S): at 23:19

## 2018-04-16 NOTE — ED ADULT NURSE NOTE - OBJECTIVE STATEMENT
RN note: aaox4 female ambulatory from home presents to the ED with abd pain, bladder spasms, leaking from urethra (cheng cath site) that began at 730pm.   "I feel the urine running down my leg". pt is POD #6 s/p vulvectomy for cancer. noted with RLQ BHAVNA drain in situ, 25cc ameya red blood noted, last emptied 10cc this morning. vaginal incision wound site appears clean/dry. pt denies nausea, vomiting, diarrhea, fevers, chills, flank pain, dysuria. 16 FR cheng cath was removed in the ED, urine appears cloudy, and foul smelling. pt attempted to void, but not able to. will give pitcher of water and medicate for bladder spasms as ordered. awaiting GYN eval. will monitor and reassess -zainab monk RN

## 2018-04-16 NOTE — ED ADULT TRIAGE NOTE - CHIEF COMPLAINT QUOTE
Pt presents at POD#6 s/p volvectomy; stated fluid leakage from site, called PMD and was told to come to ED. BHAVNA in situ, 8/10 pn. Pt presents at POD#6 s/p vulvectomy; stated fluid leakage from site, called PMD and was told to come to ED. BHAVNA in situ, 8/10 pn.

## 2018-04-16 NOTE — ED ADULT NURSE REASSESSMENT NOTE - NS ED NURSE REASSESS COMMENT FT1
16 FR cheng catheter removed as per ISAÍAS Velazquez. 16 FR cheng catheter removed as per ISAÍAS Aceves. pt failed voiding trial. isaías aceves made aware.

## 2018-04-16 NOTE — ED ADULT NURSE NOTE - CHIEF COMPLAINT QUOTE
Pt presents at POD#6 s/p vulvectomy; stated fluid leakage from site, called PMD and was told to come to ED. BHAVNA in situ, 8/10 pn.

## 2018-04-17 VITALS
HEART RATE: 80 BPM | SYSTOLIC BLOOD PRESSURE: 124 MMHG | OXYGEN SATURATION: 96 % | TEMPERATURE: 98 F | DIASTOLIC BLOOD PRESSURE: 78 MMHG | RESPIRATION RATE: 19 BRPM

## 2018-04-17 DIAGNOSIS — M32.9 SYSTEMIC LUPUS ERYTHEMATOSUS, UNSPECIFIED: ICD-10-CM

## 2018-04-17 DIAGNOSIS — C51.9 MALIGNANT NEOPLASM OF VULVA, UNSPECIFIED: ICD-10-CM

## 2018-04-17 DIAGNOSIS — G43.909 MIGRAINE, UNSPECIFIED, NOT INTRACTABLE, WITHOUT STATUS MIGRAINOSUS: ICD-10-CM

## 2018-04-17 LAB
APPEARANCE UR: (no result)
BACTERIA # UR AUTO: (no result) /HPF
BILIRUB UR-MCNC: NEGATIVE — SIGNIFICANT CHANGE UP
COLOR SPEC: YELLOW — SIGNIFICANT CHANGE UP
COMMENT - URINE: SIGNIFICANT CHANGE UP
DIFF PNL FLD: (no result)
EPI CELLS # UR: (no result) /HPF (ref 0–5)
GLUCOSE UR QL: NEGATIVE — SIGNIFICANT CHANGE UP
KETONES UR-MCNC: NEGATIVE — SIGNIFICANT CHANGE UP
LEUKOCYTE ESTERASE UR-ACNC: (no result)
NITRITE UR-MCNC: POSITIVE
PH UR: 6.5 — SIGNIFICANT CHANGE UP (ref 5–8)
PROT UR-MCNC: (no result) MG/DL
RBC CASTS # UR COMP ASSIST: > 10 /HPF
SP GR SPEC: 1.01 — SIGNIFICANT CHANGE UP (ref 1–1.03)
SURGICAL PATHOLOGY STUDY: SIGNIFICANT CHANGE UP
UROBILINOGEN FLD QL: 0.2 E.U./DL — SIGNIFICANT CHANGE UP
WBC UR QL: > 10 /HPF

## 2018-04-17 PROCEDURE — 99284 EMERGENCY DEPT VISIT MOD MDM: CPT

## 2018-04-17 PROCEDURE — 81001 URINALYSIS AUTO W/SCOPE: CPT

## 2018-04-17 PROCEDURE — 87086 URINE CULTURE/COLONY COUNT: CPT

## 2018-04-17 PROCEDURE — 87186 SC STD MICRODIL/AGAR DIL: CPT

## 2018-04-17 RX ORDER — AZTREONAM 2 G
1 VIAL (EA) INJECTION
Qty: 6 | Refills: 0 | OUTPATIENT
Start: 2018-04-17 | End: 2018-04-19

## 2018-04-17 RX ORDER — AZTREONAM 2 G
1 VIAL (EA) INJECTION
Qty: 6 | Refills: 0
Start: 2018-04-17 | End: 2018-04-19

## 2018-04-17 NOTE — CONSULT NOTE ADULT - SUBJECTIVE AND OBJECTIVE BOX
62y POD7 from radical vulvectomy with postoperative course complicated by urinary retention and 3 failed TOVs. Pt was sent home  with leg bag. Today she reports to ED with complaints of bladder pain and leaking of cheng. Pt denies f/c/n/v. States some spotting from incision but otherwise pain has been better controlled at home. She is ambulating and tolerating PO without difficulty.       PAST MEDICAL & SURGICAL HISTORY:  Lupus (systemic lupus erythematosus)  Migraines  H/O:  section    No Known Allergies    Intolerances        PHYSICAL EXAM:   Vital Signs Last 24 Hrs  T(C): 36.7 (2018 00:35), Max: 36.7 (2018 00:35)  T(F): 98 (2018 00:35), Max: 98 (2018 00:35)  HR: 80 (2018 00:35) (80 - 103)  BP: 124/78 (2018 00:35) (124/78 - 146/82)  BP(mean): --  RR: 19 (2018 00:35) (19 - 22)  SpO2: 96% (2018 00:35) (96% - 97%)    **************************  Constitutional: Alert & Oriented x3, No acute distress, cooperative   Respiratory: Clear to ausculation bilaterally; no wheezing, rhonchi, or crackles  Cardiovascular: S1 &S2 physiologic, no murmurs, or gallops  Gastrointestinal: soft, non tender, positive bowel sounds, no rebound or guarding   Vaginal/Incision Inspection: Healing wound clean and dry. No discharge or foul smelling odor  Extremities: no calf tenderness or swelling      LABS:            Urinalysis Basic - ( 2018 00:33 )    Color: Yellow / Appearance: SL Cloudy / S.010 / pH: x  Gluc: x / Ketone: NEGATIVE  / Bili: Negative / Urobili: 0.2 E.U./dL   Blood: x / Protein: Trace mg/dL / Nitrite: POSITIVE   Leuk Esterase: Large / RBC: > 10 /HPF / WBC > 10 /HPF   Sq Epi: x / Non Sq Epi: 5-10 /HPF / Bacteria: Many /HPF

## 2018-04-17 NOTE — ED PROVIDER NOTE - NS ED ROS FT
CONSTITUTIONAL: No fever, chills, or weakness  NEURO: No headache, no dizziness, no syncope; No weakness/tingling/numbness  EYES: No visual changes  ENT: No rhinorrhea or sore throat  PULM: No cough or dyspnea  CV: No chest pain or palpitations  GI: No vomiting or diarrhea  : HPI  MSK: No neck pain or back pain, no joint pain  SKIN: no rash

## 2018-04-17 NOTE — ED PROVIDER NOTE - OBJECTIVE STATEMENT
POD # 6 s/p vulvar resection for cancer, has BHAVNA drain and Cheng cath in place.  has been doing well all week, but tonight  around 7:30 began to have leakage of urine around cheng.  Has feeling of suprapubic pain and spasm.  No radiation of pain.  No problems with wound.  No fever or chills.  No hematuria.

## 2018-04-17 NOTE — CONSULT NOTE ADULT - ASSESSMENT
62y POD7 from radical vulvectomy and LND presents with leakage from urinary cheng and bladder pain. TOV in ED successul and pt feels better. UA positive for Nirtite, LE, and bacteria. Please give one dose of Bactrim here and send home with 3d course. Pt to f/u with Dr. Garcia 4/18 from BHAVNA removal and postop f/u    d/w Dr. Garcia

## 2018-04-17 NOTE — ED PROVIDER NOTE - PHYSICAL EXAMINATION
CONSTITUTIONAL: WD,WN. NAD.    SKIN: Normal color and turgor. No rash.    HEAD: NC/AT.  EYES: Conjunctiva clear. EOMI. PERRL.    ENT: Airway patent, OP without erythema, tonsillar swelling or exudate; uvula midline without swelling. Nasal mucosa clear, no rhinorrhea.   RESPIRATORY:  Breathing non-labored. No retractions or accessory muscle use.  Lungs CTA bilat.  CARDIOVASCULAR:  RRR, S1S2. No M/R/G.      GI:  Abdomen soft, nontender.    : BHAVNA and surgical sites with mild ecchymosis, no erythema/warmth.  No CVAT.  MSK: Neck supple with painless ROM.  No extremity edema or tenderness.  No joint swelling or ROM limitation.  NEURO: Alert and oriented; CN II-XII grossly intact. Speech clear. 5/5 strength in all extremities.  Normal balance and gait.

## 2018-04-17 NOTE — ED PROVIDER NOTE - CARE PLAN
Principal Discharge DX:	Wilkinson catheter problem, initial encounter Principal Discharge DX:	Wilkinson catheter problem, initial encounter  Secondary Diagnosis:	Urinary tract infection associated with indwelling urethral catheter, initial encounter

## 2018-04-17 NOTE — ED PROVIDER NOTE - PROGRESS NOTE DETAILS
Pt was able to void after removal of Wilkinson cath and a dose of Ditropan. Seen by GYN and clear for DC home.  Has apponitment w Dr Garcia this Wednesday.

## 2018-04-18 ENCOUNTER — APPOINTMENT (OUTPATIENT)
Dept: GYNECOLOGIC ONCOLOGY | Facility: CLINIC | Age: 63
End: 2018-04-18
Payer: COMMERCIAL

## 2018-04-18 VITALS
WEIGHT: 175 LBS | HEART RATE: 76 BPM | BODY MASS INDEX: 39.23 KG/M2 | SYSTOLIC BLOOD PRESSURE: 130 MMHG | DIASTOLIC BLOOD PRESSURE: 72 MMHG

## 2018-04-18 PROCEDURE — 99214 OFFICE O/P EST MOD 30 MIN: CPT | Mod: 24

## 2018-04-18 RX ORDER — AMOXICILLIN AND CLAVULANATE POTASSIUM 875; 125 MG/1; MG/1
875-125 TABLET, COATED ORAL TWICE DAILY
Qty: 14 | Refills: 0 | Status: ACTIVE | COMMUNITY
Start: 2018-04-18 | End: 1900-01-01

## 2018-04-18 RX ORDER — OXYCODONE AND ACETAMINOPHEN 5; 325 MG/1; MG/1
5-325 TABLET ORAL
Qty: 30 | Refills: 0 | Status: COMPLETED | COMMUNITY
Start: 2018-04-06 | End: 2018-04-18

## 2018-04-19 LAB
-  AMPICILLIN/SULBACTAM: SIGNIFICANT CHANGE UP
-  AMPICILLIN: SIGNIFICANT CHANGE UP
-  CEFAZOLIN: SIGNIFICANT CHANGE UP
-  CEFTRIAXONE: SIGNIFICANT CHANGE UP
-  CIPROFLOXACIN: SIGNIFICANT CHANGE UP
-  GENTAMICIN: SIGNIFICANT CHANGE UP
-  NITROFURANTOIN: SIGNIFICANT CHANGE UP
-  PIPERACILLIN/TAZOBACTAM: SIGNIFICANT CHANGE UP
-  TOBRAMYCIN: SIGNIFICANT CHANGE UP
-  TRIMETHOPRIM/SULFAMETHOXAZOLE: SIGNIFICANT CHANGE UP
CULTURE RESULTS: SIGNIFICANT CHANGE UP
METHOD TYPE: SIGNIFICANT CHANGE UP
ORGANISM # SPEC MICROSCOPIC CNT: SIGNIFICANT CHANGE UP
ORGANISM # SPEC MICROSCOPIC CNT: SIGNIFICANT CHANGE UP
SPECIMEN SOURCE: SIGNIFICANT CHANGE UP

## 2018-04-25 ENCOUNTER — APPOINTMENT (OUTPATIENT)
Dept: GYNECOLOGIC ONCOLOGY | Facility: CLINIC | Age: 63
End: 2018-04-25
Payer: COMMERCIAL

## 2018-04-25 VITALS
DIASTOLIC BLOOD PRESSURE: 84 MMHG | WEIGHT: 175 LBS | SYSTOLIC BLOOD PRESSURE: 138 MMHG | HEART RATE: 82 BPM | HEIGHT: 56 IN | OXYGEN SATURATION: 96 % | BODY MASS INDEX: 39.37 KG/M2

## 2018-04-25 PROCEDURE — 99214 OFFICE O/P EST MOD 30 MIN: CPT | Mod: 24

## 2018-05-02 ENCOUNTER — APPOINTMENT (OUTPATIENT)
Dept: GYNECOLOGIC ONCOLOGY | Facility: CLINIC | Age: 63
End: 2018-05-02
Payer: COMMERCIAL

## 2018-05-02 VITALS
HEART RATE: 92 BPM | DIASTOLIC BLOOD PRESSURE: 83 MMHG | HEIGHT: 56 IN | BODY MASS INDEX: 38.69 KG/M2 | OXYGEN SATURATION: 97 % | WEIGHT: 172 LBS | SYSTOLIC BLOOD PRESSURE: 149 MMHG

## 2018-05-02 DIAGNOSIS — B37.2 CANDIDIASIS OF SKIN AND NAIL: ICD-10-CM

## 2018-05-02 PROCEDURE — 12021 TX SUPFC WND DEHSN W/PACKING: CPT | Mod: 78

## 2018-05-02 PROCEDURE — 99213 OFFICE O/P EST LOW 20 MIN: CPT | Mod: 24

## 2018-05-02 RX ORDER — NYSTATIN 100000 1/G
100000 POWDER TOPICAL AS DIRECTED
Qty: 1 | Refills: 2 | Status: ACTIVE | COMMUNITY
Start: 2018-05-02 | End: 1900-01-01

## 2018-05-09 ENCOUNTER — APPOINTMENT (OUTPATIENT)
Dept: GYNECOLOGIC ONCOLOGY | Facility: CLINIC | Age: 63
End: 2018-05-09
Payer: COMMERCIAL

## 2018-05-09 VITALS
WEIGHT: 171.5 LBS | DIASTOLIC BLOOD PRESSURE: 87 MMHG | SYSTOLIC BLOOD PRESSURE: 138 MMHG | BODY MASS INDEX: 38.58 KG/M2 | OXYGEN SATURATION: 94 % | HEIGHT: 56 IN | HEART RATE: 81 BPM

## 2018-05-09 PROCEDURE — 99213 OFFICE O/P EST LOW 20 MIN: CPT | Mod: 24

## 2018-05-09 PROCEDURE — 12021 TX SUPFC WND DEHSN W/PACKING: CPT | Mod: 78

## 2018-05-16 ENCOUNTER — APPOINTMENT (OUTPATIENT)
Dept: GYNECOLOGIC ONCOLOGY | Facility: CLINIC | Age: 63
End: 2018-05-16
Payer: COMMERCIAL

## 2018-05-16 VITALS
HEIGHT: 56 IN | OXYGEN SATURATION: 96 % | WEIGHT: 171.38 LBS | HEART RATE: 80 BPM | SYSTOLIC BLOOD PRESSURE: 133 MMHG | BODY MASS INDEX: 38.55 KG/M2 | DIASTOLIC BLOOD PRESSURE: 87 MMHG

## 2018-05-16 PROCEDURE — 99213 OFFICE O/P EST LOW 20 MIN: CPT | Mod: 24

## 2018-05-23 ENCOUNTER — OTHER (OUTPATIENT)
Age: 63
End: 2018-05-23

## 2018-05-24 ENCOUNTER — APPOINTMENT (OUTPATIENT)
Dept: GYNECOLOGIC ONCOLOGY | Facility: CLINIC | Age: 63
End: 2018-05-24
Payer: COMMERCIAL

## 2018-05-24 VITALS
HEIGHT: 56 IN | DIASTOLIC BLOOD PRESSURE: 78 MMHG | HEART RATE: 70 BPM | BODY MASS INDEX: 38.72 KG/M2 | WEIGHT: 172.13 LBS | SYSTOLIC BLOOD PRESSURE: 137 MMHG | OXYGEN SATURATION: 98 %

## 2018-05-24 DIAGNOSIS — T81.31XA DISRUPTION OF EXTERNAL OPERATION (SURGICAL) WOUND, NOT ELSEWHERE CLASSIFIED, INITIAL ENCOUNTER: ICD-10-CM

## 2018-05-24 PROCEDURE — 99213 OFFICE O/P EST LOW 20 MIN: CPT | Mod: 24

## 2018-05-31 PROBLEM — T81.31XA POSTOPERATIVE WOUND DEHISCENCE, INITIAL ENCOUNTER: Status: ACTIVE | Noted: 2018-04-18

## 2018-07-11 ENCOUNTER — APPOINTMENT (OUTPATIENT)
Dept: GYNECOLOGIC ONCOLOGY | Facility: CLINIC | Age: 63
End: 2018-07-11
Payer: COMMERCIAL

## 2018-07-11 VITALS
WEIGHT: 174 LBS | DIASTOLIC BLOOD PRESSURE: 80 MMHG | SYSTOLIC BLOOD PRESSURE: 142 MMHG | HEIGHT: 56 IN | BODY MASS INDEX: 39.14 KG/M2 | OXYGEN SATURATION: 98 % | HEART RATE: 79 BPM

## 2018-07-11 DIAGNOSIS — T81.30XA DISRUPTION OF WOUND, UNSPECIFIED, INITIAL ENCOUNTER: ICD-10-CM

## 2018-07-11 PROCEDURE — 99214 OFFICE O/P EST MOD 30 MIN: CPT

## 2018-07-12 PROBLEM — T81.30XA WOUND DEHISCENCE: Status: ACTIVE | Noted: 2018-04-18

## 2018-08-15 ENCOUNTER — APPOINTMENT (OUTPATIENT)
Dept: GYNECOLOGIC ONCOLOGY | Facility: CLINIC | Age: 63
End: 2018-08-15
Payer: COMMERCIAL

## 2018-08-15 VITALS
SYSTOLIC BLOOD PRESSURE: 136 MMHG | HEIGHT: 56 IN | OXYGEN SATURATION: 96 % | BODY MASS INDEX: 39.67 KG/M2 | WEIGHT: 176.38 LBS | HEART RATE: 79 BPM | DIASTOLIC BLOOD PRESSURE: 82 MMHG

## 2018-08-15 DIAGNOSIS — Z00.00 ENCOUNTER FOR GENERAL ADULT MEDICAL EXAMINATION W/OUT ABNORMAL FINDINGS: ICD-10-CM

## 2018-08-15 PROCEDURE — 99214 OFFICE O/P EST MOD 30 MIN: CPT | Mod: 25

## 2018-08-15 PROCEDURE — 56605 BIOPSY OF VULVA/PERINEUM: CPT

## 2018-08-27 LAB — LH SURGICAL PATHOLOGY FINAL REPORT: NORMAL

## 2018-11-19 ENCOUNTER — APPOINTMENT (OUTPATIENT)
Dept: GYNECOLOGIC ONCOLOGY | Facility: CLINIC | Age: 63
End: 2018-11-19
Payer: COMMERCIAL

## 2018-11-19 VITALS
WEIGHT: 177.25 LBS | BODY MASS INDEX: 39.87 KG/M2 | DIASTOLIC BLOOD PRESSURE: 83 MMHG | HEIGHT: 56 IN | SYSTOLIC BLOOD PRESSURE: 142 MMHG | OXYGEN SATURATION: 97 % | HEART RATE: 94 BPM

## 2018-11-19 PROCEDURE — 99214 OFFICE O/P EST MOD 30 MIN: CPT | Mod: 25

## 2018-11-19 PROCEDURE — 56605 BIOPSY OF VULVA/PERINEUM: CPT

## 2018-12-24 PROBLEM — M32.9 LUPUS: Status: RESOLVED | Noted: 2018-03-23 | Resolved: 2018-12-24

## 2019-05-03 ENCOUNTER — APPOINTMENT (OUTPATIENT)
Dept: GYNECOLOGIC ONCOLOGY | Facility: CLINIC | Age: 64
End: 2019-05-03
Payer: COMMERCIAL

## 2019-05-03 VITALS
WEIGHT: 175.13 LBS | HEIGHT: 56 IN | SYSTOLIC BLOOD PRESSURE: 146 MMHG | BODY MASS INDEX: 39.4 KG/M2 | DIASTOLIC BLOOD PRESSURE: 81 MMHG | HEART RATE: 96 BPM

## 2019-05-03 PROCEDURE — 99215 OFFICE O/P EST HI 40 MIN: CPT

## 2019-05-03 RX ORDER — NYSTATIN 100000 1/G
100000 POWDER TOPICAL
Qty: 1 | Refills: 2 | Status: ACTIVE | COMMUNITY
Start: 2019-05-03 | End: 1900-01-01

## 2019-05-03 NOTE — REASON FOR VISIT
[FreeTextEntry1] : Surveillance. Patient presents for her 3 month follow-up. Patient complains of an occasional "pulling" pain in her right groin. \par \par She is also requesting nystatin powder as she is prone to candida infections of the skin under her pannus.\par \par No other acute complaints. ROS negative

## 2019-05-03 NOTE — PHYSICAL EXAM
[de-identified] : Skin dermatitis now resolved. Vulvar incision now 80% healed. Clean granulation tissue. [de-identified] : no lymphadenopathy [de-identified] : mucosa now well healed, ROZ [de-identified] : Incisions healed

## 2019-05-03 NOTE — ASSESSMENT
[FreeTextEntry1] : Patient doing well. ROZ\par \par [] Patient will follow-up in 3 months \par [] Rx nystatin powder\par

## 2019-05-03 NOTE — HISTORY OF PRESENT ILLNESS
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 \par \par Previous Therapy\par 1)US 3/05/2018\par    a)Uterus 5.2x4.5x3.7cm\par    b)Endometrial stripe 0.92mm\par    c)Normal b/l ovaries \par 2)Vulvar Mass Bx 3/06/2018\par    a)Poorly differentiated mostly squamous cell carcinoma in situ suspicious for invasion \par 3)CTAP 3/21/2018\par    a)Uterus with fibroid of 0.8cm \par    b)Endometrial and endocervical canals are not distended, no parametrial or perivaginal mass lesions\par    c)Normal retroperitoneal, pelvic sidewall and groin LN\par 4)PET 4/03/2018\par    a)Hypermetabolic anterior vulva \par    b)No lymph node activity identified\par 5)Radical vulvectomy, bilateral sentinel lymph node biopsy with nuclear medicine 4/10/2018\par     a) Moderately diff squamous cell carcinoma of the vulva: FIGO stage II (T2 N0 M0) with >1 cm \par 6)Vulvar Bx 8/15/18\par    a)Benign squamous mucosa with acute granulation tissue negative for carcinoma \par \par \par Health Maintenance\par Mammogram 8/2017

## 2019-06-09 ENCOUNTER — INPATIENT (INPATIENT)
Facility: HOSPITAL | Age: 64
LOS: 3 days | Discharge: HOME | DRG: 419 | End: 2019-06-13
Attending: STUDENT IN AN ORGANIZED HEALTH CARE EDUCATION/TRAINING PROGRAM | Admitting: STUDENT IN AN ORGANIZED HEALTH CARE EDUCATION/TRAINING PROGRAM
Payer: COMMERCIAL

## 2019-06-09 VITALS
SYSTOLIC BLOOD PRESSURE: 159 MMHG | DIASTOLIC BLOOD PRESSURE: 91 MMHG | HEART RATE: 106 BPM | TEMPERATURE: 98 F | OXYGEN SATURATION: 97 % | WEIGHT: 169.54 LBS | RESPIRATION RATE: 18 BRPM

## 2019-06-09 DIAGNOSIS — Z98.891 HISTORY OF UTERINE SCAR FROM PREVIOUS SURGERY: Chronic | ICD-10-CM

## 2019-06-09 PROCEDURE — 71045 X-RAY EXAM CHEST 1 VIEW: CPT | Mod: 26

## 2019-06-09 PROCEDURE — 93010 ELECTROCARDIOGRAM REPORT: CPT

## 2019-06-09 PROCEDURE — 76705 ECHO EXAM OF ABDOMEN: CPT | Mod: 26

## 2019-06-09 PROCEDURE — 74177 CT ABD & PELVIS W/CONTRAST: CPT | Mod: 26

## 2019-06-09 PROCEDURE — 99285 EMERGENCY DEPT VISIT HI MDM: CPT | Mod: 25

## 2019-06-09 RX ORDER — CEFTRIAXONE 500 MG/1
2 INJECTION, POWDER, FOR SOLUTION INTRAMUSCULAR; INTRAVENOUS EVERY 24 HOURS
Refills: 0 | Status: DISCONTINUED | OUTPATIENT
Start: 2019-06-10 | End: 2019-06-10

## 2019-06-09 RX ORDER — ONDANSETRON 8 MG/1
4 TABLET, FILM COATED ORAL ONCE
Refills: 0 | Status: COMPLETED | OUTPATIENT
Start: 2019-06-09 | End: 2019-06-10

## 2019-06-09 RX ORDER — FAMOTIDINE 10 MG/ML
20 INJECTION INTRAVENOUS ONCE
Refills: 0 | Status: COMPLETED | OUTPATIENT
Start: 2019-06-09 | End: 2019-06-09

## 2019-06-09 RX ORDER — CEFTRIAXONE 500 MG/1
1 INJECTION, POWDER, FOR SOLUTION INTRAMUSCULAR; INTRAVENOUS ONCE
Refills: 0 | Status: COMPLETED | OUTPATIENT
Start: 2019-06-09 | End: 2019-06-09

## 2019-06-09 RX ORDER — METRONIDAZOLE 500 MG
500 TABLET ORAL ONCE
Refills: 0 | Status: COMPLETED | OUTPATIENT
Start: 2019-06-09 | End: 2019-06-09

## 2019-06-09 RX ORDER — DILTIAZEM HCL 120 MG
10 CAPSULE, EXT RELEASE 24 HR ORAL
Qty: 125 | Refills: 0 | Status: DISCONTINUED | OUTPATIENT
Start: 2019-06-09 | End: 2019-06-09

## 2019-06-09 RX ORDER — HEPARIN SODIUM 5000 [USP'U]/ML
5000 INJECTION INTRAVENOUS; SUBCUTANEOUS EVERY 8 HOURS
Refills: 0 | Status: DISCONTINUED | OUTPATIENT
Start: 2019-06-09 | End: 2019-06-10

## 2019-06-09 RX ORDER — IOHEXOL 300 MG/ML
30 INJECTION, SOLUTION INTRAVENOUS ONCE
Refills: 0 | Status: COMPLETED | OUTPATIENT
Start: 2019-06-09 | End: 2019-06-09

## 2019-06-09 RX ORDER — HYDROMORPHONE HYDROCHLORIDE 2 MG/ML
1 INJECTION INTRAMUSCULAR; INTRAVENOUS; SUBCUTANEOUS EVERY 4 HOURS
Refills: 0 | Status: DISCONTINUED | OUTPATIENT
Start: 2019-06-09 | End: 2019-06-10

## 2019-06-09 RX ORDER — CEFTRIAXONE 500 MG/1
INJECTION, POWDER, FOR SOLUTION INTRAMUSCULAR; INTRAVENOUS
Refills: 0 | Status: DISCONTINUED | OUTPATIENT
Start: 2019-06-09 | End: 2019-06-10

## 2019-06-09 RX ORDER — MORPHINE SULFATE 50 MG/1
4 CAPSULE, EXTENDED RELEASE ORAL ONCE
Refills: 0 | Status: DISCONTINUED | OUTPATIENT
Start: 2019-06-09 | End: 2019-06-09

## 2019-06-09 RX ORDER — ONDANSETRON 8 MG/1
4 TABLET, FILM COATED ORAL ONCE
Refills: 0 | Status: COMPLETED | OUTPATIENT
Start: 2019-06-09 | End: 2019-06-09

## 2019-06-09 RX ORDER — MORPHINE SULFATE 50 MG/1
2 CAPSULE, EXTENDED RELEASE ORAL EVERY 4 HOURS
Refills: 0 | Status: DISCONTINUED | OUTPATIENT
Start: 2019-06-09 | End: 2019-06-10

## 2019-06-09 RX ORDER — METRONIDAZOLE 500 MG
500 TABLET ORAL EVERY 8 HOURS
Refills: 0 | Status: DISCONTINUED | OUTPATIENT
Start: 2019-06-10 | End: 2019-06-10

## 2019-06-09 RX ORDER — SODIUM CHLORIDE 9 MG/ML
1000 INJECTION INTRAMUSCULAR; INTRAVENOUS; SUBCUTANEOUS
Refills: 0 | Status: DISCONTINUED | OUTPATIENT
Start: 2019-06-09 | End: 2019-06-10

## 2019-06-09 RX ORDER — CEFTRIAXONE 500 MG/1
2 INJECTION, POWDER, FOR SOLUTION INTRAMUSCULAR; INTRAVENOUS ONCE
Refills: 0 | Status: COMPLETED | OUTPATIENT
Start: 2019-06-09 | End: 2019-06-09

## 2019-06-09 RX ORDER — METRONIDAZOLE 500 MG
TABLET ORAL
Refills: 0 | Status: DISCONTINUED | OUTPATIENT
Start: 2019-06-09 | End: 2019-06-10

## 2019-06-09 RX ORDER — DILTIAZEM HCL 120 MG
10 CAPSULE, EXT RELEASE 24 HR ORAL ONCE
Refills: 0 | Status: DISCONTINUED | OUTPATIENT
Start: 2019-06-09 | End: 2019-06-09

## 2019-06-09 RX ORDER — SUCRALFATE 1 G
1 TABLET ORAL ONCE
Refills: 0 | Status: COMPLETED | OUTPATIENT
Start: 2019-06-09 | End: 2019-06-09

## 2019-06-09 RX ORDER — PANTOPRAZOLE SODIUM 20 MG/1
40 TABLET, DELAYED RELEASE ORAL DAILY
Refills: 0 | Status: DISCONTINUED | OUTPATIENT
Start: 2019-06-09 | End: 2019-06-10

## 2019-06-09 RX ADMIN — Medication 1 GRAM(S): at 16:00

## 2019-06-09 RX ADMIN — MORPHINE SULFATE 4 MILLIGRAM(S): 50 CAPSULE, EXTENDED RELEASE ORAL at 18:16

## 2019-06-09 RX ADMIN — IOHEXOL 30 MILLILITER(S): 300 INJECTION, SOLUTION INTRAVENOUS at 15:54

## 2019-06-09 RX ADMIN — MORPHINE SULFATE 4 MILLIGRAM(S): 50 CAPSULE, EXTENDED RELEASE ORAL at 23:47

## 2019-06-09 RX ADMIN — ONDANSETRON 4 MILLIGRAM(S): 8 TABLET, FILM COATED ORAL at 18:16

## 2019-06-09 RX ADMIN — MORPHINE SULFATE 4 MILLIGRAM(S): 50 CAPSULE, EXTENDED RELEASE ORAL at 23:02

## 2019-06-09 RX ADMIN — SODIUM CHLORIDE 115 MILLILITER(S): 9 INJECTION INTRAMUSCULAR; INTRAVENOUS; SUBCUTANEOUS at 23:58

## 2019-06-09 RX ADMIN — HEPARIN SODIUM 5000 UNIT(S): 5000 INJECTION INTRAVENOUS; SUBCUTANEOUS at 22:34

## 2019-06-09 RX ADMIN — ONDANSETRON 4 MILLIGRAM(S): 8 TABLET, FILM COATED ORAL at 16:00

## 2019-06-09 RX ADMIN — Medication 100 MILLIGRAM(S): at 21:27

## 2019-06-09 RX ADMIN — CEFTRIAXONE 100 GRAM(S): 500 INJECTION, POWDER, FOR SOLUTION INTRAMUSCULAR; INTRAVENOUS at 21:08

## 2019-06-09 RX ADMIN — FAMOTIDINE 20 MILLIGRAM(S): 10 INJECTION INTRAVENOUS at 16:00

## 2019-06-09 NOTE — ED ADULT NURSE NOTE - NSIMPLEMENTINTERV_GEN_ALL_ED
Implemented All Universal Safety Interventions:  [place] to call system. Call bell, personal items and telephone within reach. Instruct patient to call for assistance. Room bathroom lighting operational. Non-slip footwear when patient is off stretcher. Physically safe environment: no spills, clutter or unnecessary equipment. Stretcher in lowest position, wheels locked, appropriate side rails in place.

## 2019-06-09 NOTE — H&P ADULT - ATTENDING COMMENTS
64 yo F w/ hx of Lupus (not on medication) associated w/ frequent migraines, and vulvar cancer s/p partial vulvectomy (2018), presenting 6/9 w/ RUQ abdominal pain  US showing acute cholecystitis  Vitals stable  Abdomen soft, tender RUQ  WBC normal  LFTs t bili 1.2, alk phos 154, AST//227    Elevation in LFTs from admission likely secondary to acute inflammation and obstructing stone in GB neck, will trend lfts post cholecystectomy  Risks and benefits of surgery discussed with patient and consent obtained for laparoscopic cholecystectomy.

## 2019-06-09 NOTE — ED ADULT NURSE NOTE - OBJECTIVE STATEMENT
c.o epigastric pain radiating to back since friday. states she was dx with gastritis on Saturday. denies any sob, fever but c.o chills. pt denies any nausea, vomiting, diarrhea. normal bm today

## 2019-06-09 NOTE — ED PROVIDER NOTE - CLINICAL SUMMARY MEDICAL DECISION MAKING FREE TEXT BOX
Patient in ED w concern for epigastric abd pain x several days associated w nausea, no emesis.  Non toxic appearing, + uncomfortable w ttp over epigastric area.  Labs and CT initially ordered as patient now w 2nd visit for these symptoms.  CT sugguestive of possible acute cholecystitis with gallstone in gallbladder neck.  US ordered for further eval.  General surgery consulted and will come to ED to eval and offer further recommendations.  Following completion of my shift, patient's care is handed over to oncoming night team for final disposition pending US completion and surgical evaluation.  Patient is aware of plan and stable at time of transfer of care.

## 2019-06-09 NOTE — H&P ADULT - HISTORY OF PRESENT ILLNESS
Attending:  Mani Hensley    HPI:  Ms. Liriano is a 64 yo F w/ hx of Lupus (not on medication) associated w/ frequent migraines, and vulvar cancer s/p partial vulvectomy (), presenting  to [facility] ED w/ 2 day hx of upper abdominal pain associated w/ intermittent nausea but no vomiting. She reports the pain was acute onset on Friday, and has progressively gotten worse. She denies post-prandial pattern of pain, history of recent weight loss, food fear, or changes in bowel habits. Last BM was this morning, which was normal in appearance. Denies melena or hematochezia. She reports no organ dysfunction associated with her lupus and is not on any medications. She takes migraine medication and sometimes takes Advil for muscle aches. She is otherwise healthy. No recent history of chest pain, sob, dyspnea, dizziness, lightheadedness, fevers, chills or rigors.     ED course: AFVSS, labs withing normal range: wbc 9.0, alk phos: 82, AST 16, ALT 13, lipase 14, negative troponins       PAST MEDICAL & SURGICAL HISTORY:  Lupus (systemic lupus erythematosus)  Migraines  H/O:  section ()      Home Medications:  none    Allergies  codeine - nausea (no reaction w/ IV morphine)    Intolerances          SOCIAL HISTORY:  - never smoker  - no hx of illicit drug use          Vital Signs Last 24 Hrs  T(C): 36.8 (2019 18:05), Max: 36.8 (2019 14:21)  T(F): 98.2 (2019 18:05), Max: 98.2 (2019 14:21)  HR: 84 (2019 18:05) (76 - 106)  BP: 160/74 (2019 18:05) (150/82 - 160/74)  BP(mean): --  RR: 18 (2019 18:05) (18 - 18)  SpO2: 97% (2019 18:05) (96% - 97%)      PHYSICAL EXAM  Gen:  NAD, resting comfortably  Pulm:  no respiratory distress, nonlabored breathing  C/V: s1,s2 non-tachycardic, no murmurs  Abd: soft, + reese sign, + tenderness in RUQ and epigastrium, moderate voluntary guarding of RUQ,  Extrem: nonedematous, warm and well-perfused, 2+ DP pulses        LABS:                        14.9   8.01  )-----------( 377      ( 2019 14:46 )             46.5         139  |  99  |  13  ----------------------------<  123<H>  4.3   |  28  |  0.79    Ca    9.6      2019 14:46    TPro  8.2  /  Alb  4.3  /  TBili  0.5  /  DBili  x   /  AST  16  /  ALT  13  /  AlkPhos  82      LIVER FUNCTIONS - ( 2019 14:46 )  Alb: 4.3 g/dL / Pro: 8.2 g/dL / ALK PHOS: 82 U/L / ALT: 13 U/L / AST: 16 U/L / GGT: x           PT/INR - ( 2019 14:46 )   PT: 12.3 sec;   INR: 1.09          PTT - ( 2019 14:46 )  PTT:34.2 sec  CAPILLARY BLOOD GLUCOSE        Urinalysis Basic - ( 2019 15:11 )    Color: Yellow / Appearance: Clear / S.020 / pH: x  Gluc: x / Ketone: Trace mg/dL  / Bili: Negative / Urobili: 0.2 E.U./dL   Blood: x / Protein: 30 mg/dL / Nitrite: NEGATIVE   Leuk Esterase: Small / RBC: > 10 /HPF / WBC 5-10 /HPF   Sq Epi: x / Non Sq Epi: 5-10 /HPF / Bacteria: Present /HPF            MEDICATIONS  (STANDING):  metroNIDAZOLE  IVPB 500 milliGRAM(s) IV Intermittent Once      RADIOLOGY & ADDITIONAL STUDIES      < from: US Abdomen Limited (19 @ 19:12) >    FINDINGS: Ultrasound evaluation of the right upper quadrant demonstrates   a 2.6 cm hepatic cyst with thin septation in the right lobe of the liver.   The liver is echogenic compatible with hepatic steatosis. No dilated   intrahepatic bile ducts are seen.   The common bile duct is normal in   diameter  measuring 0.5 cm. There is a nonmobile stone at the neck of the   gallbladder with associated gallbladder distention, wall thickening and   pericholecystic fluid. Sonographic Reese sign could not be assessed.   Large amount of sludge is seen within the gallbladder. Additional   gallstones are seen near the gallbladder fundus. The visualized portions   of the pancreas are unremarkable.   The visualized portions of the   abdominal aorta and inferior vena cava are normal in appearance.   The   right kidney is 10.4 cm in length and normal in appearance.       IMPRESSION:  Findings compatible with acute cholecystitis.  Hepatic steatosis.    < end of copied text >

## 2019-06-09 NOTE — ED PROVIDER NOTE - CARE PLAN
Principal Discharge DX:	Epigastric abdominal pain Principal Discharge DX:	Epigastric abdominal pain  Secondary Diagnosis:	Gallstones Principal Discharge DX:	Acute cholecystitis  Secondary Diagnosis:	Gallstones

## 2019-06-09 NOTE — ED PROVIDER NOTE - PROGRESS NOTE DETAILS
d/w radiology, US  c/w acute cholecystitis.  surgery notified. Contact info for Larissa (sister) [phone]

## 2019-06-09 NOTE — H&P ADULT - ASSESSMENT
64 yo F w/ hx of Lupus (not on medication) associated w/ frequent migraines, and vulvar cancer s/p partial vulvectomy (2018), presenting 6/9 w/ RUQ abdominal pain w/ US demonstrating +PCF, GB wall thickening consistent w/ acute cholecystitis    Plan:  - admit to general surgery - regional  - add on for OR tomorrow - laparoscopic cholecystectomy w/ Dr. Hensley (class 4)  - pain / nausea control (codeine, percocet sensitivity  nausea)  - satting well RA  - NPO (w/ meds) / IVF  - voids  - IV ceftriaxone / Flagyl  - SQH / SCDs  - f/u AM labs

## 2019-06-09 NOTE — ED PROVIDER NOTE - OBJECTIVE STATEMENT
63 year old female presents to ED with concern for abdominal discomfort that began several days ago.  Patient notes pain to epigastric area with associated nausea without emesis.  Patient states she was seen at urgent care when symptoms began, diagnosed with likely gastritis and discharged with zantac and zofran.  Patient states symptoms have persisted, though do not seem to be getting worse.  She has been taking prescribed medication.  She denies associated fever, chills, chest pain, headache, visual changes, abdominal pain, nausea, emesis, changes to bowel movements, peripheral edema or any additional acute complaints or concerns at this time.  She notes pain is worse when she eats.  No new medications.

## 2019-06-10 ENCOUNTER — RESULT REVIEW (OUTPATIENT)
Age: 64
End: 2019-06-10

## 2019-06-10 LAB
ALBUMIN SERPL ELPH-MCNC: 3.9 G/DL — SIGNIFICANT CHANGE UP (ref 3.3–5)
ALP SERPL-CCNC: 154 U/L — HIGH (ref 40–120)
ALT FLD-CCNC: 227 U/L — HIGH (ref 10–45)
ANION GAP SERPL CALC-SCNC: 12 MMOL/L — SIGNIFICANT CHANGE UP (ref 5–17)
APTT BLD: 33.5 SEC — SIGNIFICANT CHANGE UP (ref 27.5–36.3)
AST SERPL-CCNC: 289 U/L — HIGH (ref 10–40)
BILIRUB SERPL-MCNC: 1.2 MG/DL — SIGNIFICANT CHANGE UP (ref 0.2–1.2)
BLD GP AB SCN SERPL QL: NEGATIVE — SIGNIFICANT CHANGE UP
BUN SERPL-MCNC: 10 MG/DL — SIGNIFICANT CHANGE UP (ref 7–23)
CALCIUM SERPL-MCNC: 8.8 MG/DL — SIGNIFICANT CHANGE UP (ref 8.4–10.5)
CHLORIDE SERPL-SCNC: 100 MMOL/L — SIGNIFICANT CHANGE UP (ref 96–108)
CO2 SERPL-SCNC: 25 MMOL/L — SIGNIFICANT CHANGE UP (ref 22–31)
CREAT SERPL-MCNC: 0.59 MG/DL — SIGNIFICANT CHANGE UP (ref 0.5–1.3)
GLUCOSE SERPL-MCNC: 131 MG/DL — HIGH (ref 70–99)
HCG UR QL: NEGATIVE — SIGNIFICANT CHANGE UP
HCT VFR BLD CALC: 43.2 % — SIGNIFICANT CHANGE UP (ref 34.5–45)
HCV AB S/CO SERPL IA: 0.06 S/CO — SIGNIFICANT CHANGE UP
HCV AB SERPL-IMP: SIGNIFICANT CHANGE UP
HGB BLD-MCNC: 13.7 G/DL — SIGNIFICANT CHANGE UP (ref 11.5–15.5)
INR BLD: 1.09 — SIGNIFICANT CHANGE UP (ref 0.88–1.16)
MAGNESIUM SERPL-MCNC: 1.9 MG/DL — SIGNIFICANT CHANGE UP (ref 1.6–2.6)
MCHC RBC-ENTMCNC: 29.1 PG — SIGNIFICANT CHANGE UP (ref 27–34)
MCHC RBC-ENTMCNC: 31.7 GM/DL — LOW (ref 32–36)
MCV RBC AUTO: 91.9 FL — SIGNIFICANT CHANGE UP (ref 80–100)
NRBC # BLD: 0 /100 WBCS — SIGNIFICANT CHANGE UP (ref 0–0)
PHOSPHATE SERPL-MCNC: 3 MG/DL — SIGNIFICANT CHANGE UP (ref 2.5–4.5)
PLATELET # BLD AUTO: 312 K/UL — SIGNIFICANT CHANGE UP (ref 150–400)
POTASSIUM SERPL-MCNC: 4 MMOL/L — SIGNIFICANT CHANGE UP (ref 3.5–5.3)
POTASSIUM SERPL-SCNC: 4 MMOL/L — SIGNIFICANT CHANGE UP (ref 3.5–5.3)
PROT SERPL-MCNC: 7.5 G/DL — SIGNIFICANT CHANGE UP (ref 6–8.3)
PROTHROM AB SERPL-ACNC: 12.3 SEC — SIGNIFICANT CHANGE UP (ref 10–12.9)
RBC # BLD: 4.7 M/UL — SIGNIFICANT CHANGE UP (ref 3.8–5.2)
RBC # FLD: 12.9 % — SIGNIFICANT CHANGE UP (ref 10.3–14.5)
RH IG SCN BLD-IMP: POSITIVE — SIGNIFICANT CHANGE UP
SODIUM SERPL-SCNC: 137 MMOL/L — SIGNIFICANT CHANGE UP (ref 135–145)
WBC # BLD: 8.66 K/UL — SIGNIFICANT CHANGE UP (ref 3.8–10.5)
WBC # FLD AUTO: 8.66 K/UL — SIGNIFICANT CHANGE UP (ref 3.8–10.5)

## 2019-06-10 PROCEDURE — 47562 LAPAROSCOPIC CHOLECYSTECTOMY: CPT | Mod: GC

## 2019-06-10 PROCEDURE — 99222 1ST HOSP IP/OBS MODERATE 55: CPT

## 2019-06-10 PROCEDURE — 99223 1ST HOSP IP/OBS HIGH 75: CPT | Mod: GC,57,25

## 2019-06-10 RX ORDER — HEPARIN SODIUM 5000 [USP'U]/ML
5000 INJECTION INTRAVENOUS; SUBCUTANEOUS EVERY 8 HOURS
Refills: 0 | Status: DISCONTINUED | OUTPATIENT
Start: 2019-06-10 | End: 2019-06-13

## 2019-06-10 RX ORDER — ACETAMINOPHEN 500 MG
1000 TABLET ORAL ONCE
Refills: 0 | Status: COMPLETED | OUTPATIENT
Start: 2019-06-10 | End: 2019-06-10

## 2019-06-10 RX ORDER — SODIUM CHLORIDE 9 MG/ML
1000 INJECTION, SOLUTION INTRAVENOUS
Refills: 0 | Status: DISCONTINUED | OUTPATIENT
Start: 2019-06-10 | End: 2019-06-12

## 2019-06-10 RX ORDER — METRONIDAZOLE 500 MG
500 TABLET ORAL EVERY 8 HOURS
Refills: 0 | Status: COMPLETED | OUTPATIENT
Start: 2019-06-10 | End: 2019-06-11

## 2019-06-10 RX ORDER — HYDROMORPHONE HYDROCHLORIDE 2 MG/ML
1 INJECTION INTRAMUSCULAR; INTRAVENOUS; SUBCUTANEOUS EVERY 4 HOURS
Refills: 0 | Status: DISCONTINUED | OUTPATIENT
Start: 2019-06-10 | End: 2019-06-12

## 2019-06-10 RX ORDER — PROCHLORPERAZINE MALEATE 5 MG
5 TABLET ORAL ONCE
Refills: 0 | Status: COMPLETED | OUTPATIENT
Start: 2019-06-10 | End: 2019-06-10

## 2019-06-10 RX ORDER — LIDOCAINE HCL 20 MG/ML
50 VIAL (ML) INJECTION ONCE
Refills: 0 | Status: DISCONTINUED | OUTPATIENT
Start: 2019-06-10 | End: 2019-06-10

## 2019-06-10 RX ORDER — ONDANSETRON 8 MG/1
4 TABLET, FILM COATED ORAL EVERY 6 HOURS
Refills: 0 | Status: DISCONTINUED | OUTPATIENT
Start: 2019-06-10 | End: 2019-06-13

## 2019-06-10 RX ORDER — ONDANSETRON 8 MG/1
4 TABLET, FILM COATED ORAL ONCE
Refills: 0 | Status: COMPLETED | OUTPATIENT
Start: 2019-06-10 | End: 2019-06-11

## 2019-06-10 RX ORDER — CEFTRIAXONE 500 MG/1
2 INJECTION, POWDER, FOR SOLUTION INTRAMUSCULAR; INTRAVENOUS EVERY 24 HOURS
Refills: 0 | Status: COMPLETED | OUTPATIENT
Start: 2019-06-10 | End: 2019-06-11

## 2019-06-10 RX ADMIN — Medication 100 MILLIGRAM(S): at 22:11

## 2019-06-10 RX ADMIN — ONDANSETRON 4 MILLIGRAM(S): 8 TABLET, FILM COATED ORAL at 03:49

## 2019-06-10 RX ADMIN — ONDANSETRON 4 MILLIGRAM(S): 8 TABLET, FILM COATED ORAL at 15:56

## 2019-06-10 RX ADMIN — Medication 100 MILLIGRAM(S): at 06:01

## 2019-06-10 RX ADMIN — Medication 400 MILLIGRAM(S): at 19:10

## 2019-06-10 RX ADMIN — Medication 1000 MILLIGRAM(S): at 19:45

## 2019-06-10 RX ADMIN — Medication 102 MILLIGRAM(S): at 20:35

## 2019-06-10 RX ADMIN — HEPARIN SODIUM 5000 UNIT(S): 5000 INJECTION INTRAVENOUS; SUBCUTANEOUS at 06:01

## 2019-06-10 RX ADMIN — MORPHINE SULFATE 2 MILLIGRAM(S): 50 CAPSULE, EXTENDED RELEASE ORAL at 09:53

## 2019-06-10 NOTE — PRE-OP CHECKLIST - SELECT TESTS ORDERED
PT/PTT/INR/Urinalysis/EKG/CBC/CMP/BMP CMP/Type and Cross/Type and Screen/Urinalysis/INR/EKG/CXR/PT/PTT/BMP/CBC

## 2019-06-10 NOTE — CONSULT NOTE ADULT - SUBJECTIVE AND OBJECTIVE BOX
Patient is a 63y old  Female who presents with a chief complaint of Acute cholecystitis (10 James 2019 13:43)    62 y/o F w hx of lupus/ vulvar cancer and no other cardiac history presented today for a lap choley for cholecystitis. Around the time of induction with general anesthesia, patient was noted to become tachycardic with a heart rate of 150s. Per primary team, anesthesia started an esmolol drip which worsened the tachyarrhythmia in the OR. She is now post op without any major bleeds or overt complications from the procedure. She is waking up and denies any further complaints. Cardiology consulted for tachyarrhythmia during OR.       PAST MEDICAL & SURGICAL HISTORY:  Lupus (systemic lupus erythematosus)  Migraines  H/O:  section ()      Home Medications:  none    Allergies  codeine - nausea (no reaction w/ IV morphine)    Intolerances          SOCIAL HISTORY:  - never smoker  - no hx of illicit drug use      Vital Signs Last 24 Hrs  T(C): 36.8 (2019 18:05), Max: 36.8 (2019 14:21)  T(F): 98.2 (2019 18:05), Max: 98.2 (2019 14:21)  HR: 84 (2019 18:05) (76 - 106)  BP: 160/74 (2019 18:05) (150/82 - 160/74)  BP(mean): --  RR: 18 (2019 18:05) (18 - 18)  SpO2: 97% (2019 18:05) (96% - 97%)      PHYSICAL EXAM  Gen:  NAD, resting comfortably  Pulm:  no respiratory distress, nonlabored breathing  C/V: s1,s2 non-tachycardic, no murmurs  Abd: soft, + england sign, + tenderness in RUQ and epigastrium, moderate voluntary guarding of RUQ,  Extrem: nonedematous, warm and well-perfused, 2+ DP pulses        LABS:                        14.9   8.01  )-----------( 377      ( 2019 14:46 )             46.5     06-09    139  |  99  |  13  ----------------------------<  123<H>  4.3   |  28  |  0.79    Ca    9.6      2019 14:46    TPro  8.2  /  Alb  4.3  /  TBili  0.5  /  DBili  x   /  AST  16  /  ALT  13  /  AlkPhos  82  -    LIVER FUNCTIONS - ( 2019 14:46 )  Alb: 4.3 g/dL / Pro: 8.2 g/dL / ALK PHOS: 82 U/L / ALT: 13 U/L / AST: 16 U/L / GGT: x           PT/INR - ( 2019 14:46 )   PT: 12.3 sec;   INR: 1.09          PTT - ( 2019 14:46 )  PTT:34.2 sec  CAPILLARY BLOOD GLUCOSE        Urinalysis Basic - ( 2019 15:11 )    Color: Yellow / Appearance: Clear / S.020 / pH: x  Gluc: x / Ketone: Trace mg/dL  / Bili: Negative / Urobili: 0.2 E.U./dL   Blood: x / Protein: 30 mg/dL / Nitrite: NEGATIVE   Leuk Esterase: Small / RBC: > 10 /HPF / WBC 5-10 /HPF   Sq Epi: x / Non Sq Epi: 5-10 /HPF / Bacteria: Present /HPF            MEDICATIONS  (STANDING):  metroNIDAZOLE  IVPB 500 milliGRAM(s) IV Intermittent Once      RADIOLOGY & ADDITIONAL STUDIES      < from: US Abdomen Limited (19 @ 19:12) >    FINDINGS: Ultrasound evaluation of the right upper quadrant demonstrates   a 2.6 cm hepatic cyst with thin septation in the right lobe of the liver.   The liver is echogenic compatible with hepatic steatosis. No dilated   intrahepatic bile ducts are seen.   The common bile duct is normal in   diameter  measuring 0.5 cm. There is a nonmobile stone at the neck of the   gallbladder with associated gallbladder distention, wall thickening and   pericholecystic fluid. Sonographic England sign could not be assessed.   Large amount of sludge is seen within the gallbladder. Additional   gallstones are seen near the gallbladder fundus. The visualized portions   of the pancreas are unremarkable.   The visualized portions of the   abdominal aorta and inferior vena cava are normal in appearance.   The   right kidney is 10.4 cm in length and normal in appearance.       IMPRESSION:  Findings compatible with acute cholecystitis.  Hepatic steatosis.    < end of copied text > (2019 21:13)      PAST MEDICAL & SURGICAL HISTORY:  Lupus (systemic lupus erythematosus)  Migraines  H/O:  section    Allergies    codeine (Unknown)    Intolerances        MEDICATIONS  (STANDING):  cefTRIAXone   IVPB 2 Gram(s) IV Intermittent every 24 hours  heparin  Injectable 5000 Unit(s) SubCutaneous every 8 hours  lactated ringers. 1000 milliLiter(s) (100 mL/Hr) IV Continuous <Continuous>  metroNIDAZOLE  IVPB 500 milliGRAM(s) IV Intermittent every 8 hours    MEDICATIONS  (PRN):  HYDROmorphone  Injectable 1 milliGRAM(s) IV Push every 4 hours PRN Severe Pain (7 - 10)  ondansetron Injectable 4 milliGRAM(s) IV Push every 6 hours PRN Nausea      REVIEW OF SYSTEMS:  12 point ROS negative except for that stated in the HPI    PHYSICAL EXAM:  Vitals past 24 Hours: T(C): 36.9 (06-10-19 @ 14:31), Max: 36.9 (06-10-19 @ 14:31)  HR: 80 (06-10-19 @ 15:51) (80 - 104)  BP: 111/58 (06-10-19 @ 15:51) (107/59 - 160/74)  RR: 12 (06-10-19 @ 15:51) (11 - 20)  SpO2: 94% (06-10-19 @ 15:51) (93% - 97%)	    Daily Height in cm: 152.4 (10 James 2019 00:07)    Daily     GEN: Alert and awake, no acute distress  HEENT: Moist mucous membranes  Neck: No JVD  Cardiovascular: Regular rate and rhythm, +S1 S2. No murmurs, rubs, or gallops appreciated  Respiratory: Lungs clear to auscultation bilaterally  Gastrointestinal:  Soft, non-tender, non-distended, normoactive bowel sounds  Skin: No rashes, No ecchymoses, No cyanosis  Neurologic: Non-focal, alert and oriented x3.   Extremities: No clubbing, cyanosis or edema. Warm, well-perfused extremities.   Vascular: Radial and dorsalis pedis pulses 2+ bilaterally    I&O's Detail    2019 07:01  -  10 James 2019 07:00  --------------------------------------------------------  IN:  Total IN: 0 mL    OUT:    Voided: 450 mL  Total OUT: 450 mL    Total NET: -450 mL      10 James 2019 07:01  -  10 James 2019 16:11  --------------------------------------------------------  IN:    lactated ringers.: 200 mL  Total IN: 200 mL    OUT:  Total OUT: 0 mL    Total NET: 200 mL            LABS:                        13.7   8.66  )-----------( 312      ( 10 James 2019 07:22 )             43.2     06-10    137  |  100  |  10  ----------------------------<  131<H>  4.0   |  25  |  0.59    Ca    8.8      10 James 2019 07:22  Phos  3.0     06-10  Mg     1.9     06-10    TPro  7.5  /  Alb  3.9  /  TBili  1.2  /  DBili  x   /  AST  289<H>  /  ALT  227<H>  /  AlkPhos  154<H>  06-10    CARDIAC MARKERS ( 2019 14:46 )  x     / <0.01 ng/mL / 40 U/L / x     / <1.0 ng/mL      PT/INR - ( 10 James 2019 07:22 )   PT: 12.3 sec;   INR: 1.09          PTT - ( 10 James 2019 07:22 )  PTT:33.5 sec  Urinalysis Basic - ( 2019 15:11 )    Color: Yellow / Appearance: Clear / S.020 / pH: x  Gluc: x / Ketone: Trace mg/dL  / Bili: Negative / Urobili: 0.2 E.U./dL   Blood: x / Protein: 30 mg/dL / Nitrite: NEGATIVE   Leuk Esterase: Small / RBC: > 10 /HPF / WBC 5-10 /HPF   Sq Epi: x / Non Sq Epi: 5-10 /HPF / Bacteria: Present /HPF      I&O's Summary    2019 07:01  -  10 James 2019 07:00  --------------------------------------------------------  IN: 0 mL / OUT: 450 mL / NET: -450 mL    10 James 2019 07:  -  10 James 2019 16:11  --------------------------------------------------------  IN: 200 mL / OUT: 0 mL / NET: 200 mL        CARDIAC DIAGNOSTIC TESTING:    ECG:     TELEMETRY:    ECHO:  pending     RADIOLOGY & ADDITIONAL STUDIES:      ASSESSMENT/PLAN:  62 y/o F w hx of lupus/ vulvar cancer and no other cardiac history presented today for a lap choley for cholecystitis. Around the time of induction with general anesthesia, patient was noted to become tachycardic with a heart rate of 150s. Per primary team, anesthesia started an esmolol drip which worsened the tachyarrhythmia in the OR. She is now post op without any major bleeds or overt complications from the procedure. She is waking up and denies any further complaints. Cardiology consulted for tachyarrhythmia during OR.     Tachyarrthmia   Etiology likely 2/2 to meds given during anesthesia   monitor on telemetry overnight   ECHO pending to rule out any structural abnormality   If she does not have any further episodes of tachycardia, she can follow up with her outpatient cardiologist     Emerald Fonseca MD   Cardiology fellow Patient is a 63y old  Female who presents with a chief complaint of Acute cholecystitis (10 James 2019 13:43)    64 y/o F w hx of lupus/ vulvar cancer and no other cardiac history presented today for a lap choley for cholecystitis. Cardiology consulted for tachycardia with a heart rate of 140s. Per primary anesthesiologist, patient was put on monitor and noted to be tachycardic to 140s, sinus tachycardia. No intra-operative tele strips. Post op - EKG WNL. Per patient's relative at bedside, she had been in pain the night prior and very anxious prior to the surgery. Once she was given esmolol intra op her HR improved, following which she was given MTP pushes. Post op, she is HD stable, waking up. Denied any chest pain, palpitations, shortness of breath.       PAST MEDICAL & SURGICAL HISTORY:  Lupus (systemic lupus erythematosus)  Migraines  H/O:  section ()      Home Medications:  none    Allergies  codeine - nausea (no reaction w/ IV morphine)    Intolerances          SOCIAL HISTORY:  - never smoker  - no hx of illicit drug use      Vital Signs Last 24 Hrs  T(C): 36.8 (2019 18:05), Max: 36.8 (2019 14:21)  T(F): 98.2 (2019 18:05), Max: 98.2 (2019 14:21)  HR: 84 (2019 18:05) (76 - 106)  BP: 160/74 (2019 18:05) (150/82 - 160/74)  BP(mean): --  RR: 18 (2019 18:05) (18 - 18)  SpO2: 97% (2019 18:05) (96% - 97%)      PHYSICAL EXAM  Gen:  NAD, resting comfortably  Pulm:  no respiratory distress, nonlabored breathing  C/V: s1,s2 non-tachycardic, no murmurs  Abd: soft, + england sign, + tenderness in RUQ and epigastrium, moderate voluntary guarding of RUQ,  Extrem: nonedematous, warm and well-perfused, 2+ DP pulses        LABS:                        14.9   8.01  )-----------( 377      ( 2019 14:46 )             46.5     06-09    139  |  99  |  13  ----------------------------<  123<H>  4.3   |  28  |  0.79    Ca    9.6      2019 14:46    TPro  8.2  /  Alb  4.3  /  TBili  0.5  /  DBili  x   /  AST  16  /  ALT  13  /  AlkPhos  82  06-09    LIVER FUNCTIONS - ( 2019 14:46 )  Alb: 4.3 g/dL / Pro: 8.2 g/dL / ALK PHOS: 82 U/L / ALT: 13 U/L / AST: 16 U/L / GGT: x           PT/INR - ( 2019 14:46 )   PT: 12.3 sec;   INR: 1.09          PTT - ( 2019 14:46 )  PTT:34.2 sec  CAPILLARY BLOOD GLUCOSE        Urinalysis Basic - ( 2019 15:11 )    Color: Yellow / Appearance: Clear / S.020 / pH: x  Gluc: x / Ketone: Trace mg/dL  / Bili: Negative / Urobili: 0.2 E.U./dL   Blood: x / Protein: 30 mg/dL / Nitrite: NEGATIVE   Leuk Esterase: Small / RBC: > 10 /HPF / WBC 5-10 /HPF   Sq Epi: x / Non Sq Epi: 5-10 /HPF / Bacteria: Present /HPF            MEDICATIONS  (STANDING):  metroNIDAZOLE  IVPB 500 milliGRAM(s) IV Intermittent Once      RADIOLOGY & ADDITIONAL STUDIES      < from: US Abdomen Limited (19 @ 19:12) >    FINDINGS: Ultrasound evaluation of the right upper quadrant demonstrates   a 2.6 cm hepatic cyst with thin septation in the right lobe of the liver.   The liver is echogenic compatible with hepatic steatosis. No dilated   intrahepatic bile ducts are seen.   The common bile duct is normal in   diameter  measuring 0.5 cm. There is a nonmobile stone at the neck of the   gallbladder with associated gallbladder distention, wall thickening and   pericholecystic fluid. Sonographic England sign could not be assessed.   Large amount of sludge is seen within the gallbladder. Additional   gallstones are seen near the gallbladder fundus. The visualized portions   of the pancreas are unremarkable.   The visualized portions of the   abdominal aorta and inferior vena cava are normal in appearance.   The   right kidney is 10.4 cm in length and normal in appearance.       IMPRESSION:  Findings compatible with acute cholecystitis.  Hepatic steatosis.    < end of copied text > (2019 21:13)      PAST MEDICAL & SURGICAL HISTORY:  Lupus (systemic lupus erythematosus)  Migraines  H/O:  section    Allergies    codeine (Unknown)    Intolerances        MEDICATIONS  (STANDING):  cefTRIAXone   IVPB 2 Gram(s) IV Intermittent every 24 hours  heparin  Injectable 5000 Unit(s) SubCutaneous every 8 hours  lactated ringers. 1000 milliLiter(s) (100 mL/Hr) IV Continuous <Continuous>  metroNIDAZOLE  IVPB 500 milliGRAM(s) IV Intermittent every 8 hours    MEDICATIONS  (PRN):  HYDROmorphone  Injectable 1 milliGRAM(s) IV Push every 4 hours PRN Severe Pain (7 - 10)  ondansetron Injectable 4 milliGRAM(s) IV Push every 6 hours PRN Nausea      REVIEW OF SYSTEMS:  12 point ROS negative except for that stated in the HPI    PHYSICAL EXAM:  Vitals past 24 Hours: T(C): 36.9 (06-10-19 @ 14:31), Max: 36.9 (06-10-19 @ 14:31)  HR: 80 (06-10-19 @ 15:51) (80 - 104)  BP: 111/58 (06-10-19 @ 15:51) (107/59 - 160/74)  RR: 12 (06-10-19 @ 15:51) (11 - 20)  SpO2: 94% (06-10-19 @ 15:51) (93% - 97%)	    Daily Height in cm: 152.4 (10 James 2019 00:07)    Daily     GEN: Groggy and drowsy   HEENT: Moist mucous membranes  Neck: No JVD  Cardiovascular: Regular rate and rhythm, +S1 S2. No murmurs, rubs, or gallops appreciated  Respiratory: Lungs clear to auscultation bilaterally  Gastrointestinal:  Soft, non-tender, non-distended, normoactive bowel sounds  Skin: No rashes, No ecchymoses, No cyanosis  Neurologic: Non-focal, alert and oriented x3.     I&O's Detail    2019 07:01  -  10 James 2019 07:00  --------------------------------------------------------  IN:  Total IN: 0 mL    OUT:    Voided: 450 mL  Total OUT: 450 mL    Total NET: -450 mL      10 James 2019 07:01  -  10 James 2019 16:11  --------------------------------------------------------  IN:    lactated ringers.: 200 mL  Total IN: 200 mL    OUT:  Total OUT: 0 mL    Total NET: 200 mL            LABS:                        13.7   8.66  )-----------( 312      ( 10 James 2019 07:22 )             43.2     06-10    137  |  100  |  10  ----------------------------<  131<H>  4.0   |  25  |  0.59    Ca    8.8      10 James 2019 07:22  Phos  3.0     06-10  Mg     1.9     10    TPro  7.5  /  Alb  3.9  /  TBili  1.2  /  DBili  x   /  AST  289<H>  /  ALT  227<H>  /  AlkPhos  154<H>  06-10    CARDIAC MARKERS ( 2019 14:46 )  x     / <0.01 ng/mL / 40 U/L / x     / <1.0 ng/mL      PT/INR - ( 10 James 2019 07:22 )   PT: 12.3 sec;   INR: 1.09          PTT - ( 10 James 2019 07:22 )  PTT:33.5 sec  Urinalysis Basic - ( 2019 15:11 )    Color: Yellow / Appearance: Clear / S.020 / pH: x  Gluc: x / Ketone: Trace mg/dL  / Bili: Negative / Urobili: 0.2 E.U./dL   Blood: x / Protein: 30 mg/dL / Nitrite: NEGATIVE   Leuk Esterase: Small / RBC: > 10 /HPF / WBC 5-10 /HPF   Sq Epi: x / Non Sq Epi: 5-10 /HPF / Bacteria: Present /HPF      I&O's Summary    2019 07:  -  10 James 2019 07:00  --------------------------------------------------------  IN: 0 mL / OUT: 450 mL / NET: -450 mL    10 James 2019 07:01  -  10 James 2019 16:11  --------------------------------------------------------  IN: 200 mL / OUT: 0 mL / NET: 200 mL        CARDIAC DIAGNOSTIC TESTING:    ECG: normal sinus rhythm     TELEMETRY: sinus rhythm     ECHO:  pending     RADIOLOGY & ADDITIONAL STUDIES:      ASSESSMENT/PLAN:  64 y/o F w hx of lupus/ vulvar cancer and no other cardiac history presented today for a lap choley for cholecystitis. Around the time of induction with general anesthesia, patient was noted to become tachycardic with a heart rate of 150s. Per primary team, anesthesia started an esmolol drip which worsened the tachyarrhythmia in the OR. She is now post op without any major bleeds or overt complications from the procedure. She is waking up and denies any further complaints. Cardiology consulted for tachyarrhythmia during OR.     Sinus tachycardia   Etiology likely 2/2 to anxiety vs sepsis (cholangitis) vs pain   monitor on telemetry overnight   ECHO pending to rule out any structural abnormality   Treat the underlying cause   Will continue to follow     Emerald Fonseca MD   Cardiology fellow Patient is a 63y old  Female who presents with a chief complaint of Acute cholecystitis (10 James 2019 13:43)    64 y/o F w hx of lupus/ vulvar cancer and no other cardiac history presented today for a lap choley for cholecystitis. Cardiology consulted for tachycardia with a heart rate of 140s. Per primary anesthesiologist, patient was put on monitor and noted to be tachycardic to 140s, sinus tachycardia. No intra-operative tele strips. Post op - EKG WNL. Per patient's relative at bedside, she had been in pain the night prior and very anxious prior to the surgery. Once she was given esmolol intra op her HR improved, following which she was given MTP pushes. Post op, she is HD stable, waking up. Denied any chest pain, palpitations, shortness of breath.       PAST MEDICAL & SURGICAL HISTORY:  Lupus (systemic lupus erythematosus)  Migraines  H/O:  section ()      Home Medications:  none    Allergies  codeine - nausea (no reaction w/ IV morphine)    Intolerances          SOCIAL HISTORY:  - never smoker  - no hx of illicit drug use      Vital Signs Last 24 Hrs  T(C): 36.8 (2019 18:05), Max: 36.8 (2019 14:21)  T(F): 98.2 (2019 18:05), Max: 98.2 (2019 14:21)  HR: 84 (2019 18:05) (76 - 106)  BP: 160/74 (2019 18:05) (150/82 - 160/74)  BP(mean): --  RR: 18 (2019 18:05) (18 - 18)  SpO2: 97% (2019 18:05) (96% - 97%)      PHYSICAL EXAM  Gen:  NAD, resting comfortably  Pulm:  no respiratory distress, nonlabored breathing  C/V: s1,s2 non-tachycardic, no murmurs  Abd: soft, + england sign, + tenderness in RUQ and epigastrium, moderate voluntary guarding of RUQ,  Extrem: nonedematous, warm and well-perfused, 2+ DP pulses        LABS:                        14.9   8.01  )-----------( 377      ( 2019 14:46 )             46.5     06-09    139  |  99  |  13  ----------------------------<  123<H>  4.3   |  28  |  0.79    Ca    9.6      2019 14:46    TPro  8.2  /  Alb  4.3  /  TBili  0.5  /  DBili  x   /  AST  16  /  ALT  13  /  AlkPhos  82  06-09    LIVER FUNCTIONS - ( 2019 14:46 )  Alb: 4.3 g/dL / Pro: 8.2 g/dL / ALK PHOS: 82 U/L / ALT: 13 U/L / AST: 16 U/L / GGT: x           PT/INR - ( 2019 14:46 )   PT: 12.3 sec;   INR: 1.09          PTT - ( 2019 14:46 )  PTT:34.2 sec  CAPILLARY BLOOD GLUCOSE        Urinalysis Basic - ( 2019 15:11 )    Color: Yellow / Appearance: Clear / S.020 / pH: x  Gluc: x / Ketone: Trace mg/dL  / Bili: Negative / Urobili: 0.2 E.U./dL   Blood: x / Protein: 30 mg/dL / Nitrite: NEGATIVE   Leuk Esterase: Small / RBC: > 10 /HPF / WBC 5-10 /HPF   Sq Epi: x / Non Sq Epi: 5-10 /HPF / Bacteria: Present /HPF            MEDICATIONS  (STANDING):  metroNIDAZOLE  IVPB 500 milliGRAM(s) IV Intermittent Once      RADIOLOGY & ADDITIONAL STUDIES      < from: US Abdomen Limited (19 @ 19:12) >    FINDINGS: Ultrasound evaluation of the right upper quadrant demonstrates   a 2.6 cm hepatic cyst with thin septation in the right lobe of the liver.   The liver is echogenic compatible with hepatic steatosis. No dilated   intrahepatic bile ducts are seen.   The common bile duct is normal in   diameter  measuring 0.5 cm. There is a nonmobile stone at the neck of the   gallbladder with associated gallbladder distention, wall thickening and   pericholecystic fluid. Sonographic [place] sign could not be assessed.   Large amount of sludge is seen within the gallbladder. Additional   gallstones are seen near the gallbladder fundus. The visualized portions   of the pancreas are unremarkable.   The visualized portions of the   abdominal aorta and inferior vena cava are normal in appearance.   The   right kidney is 10.4 cm in length and normal in appearance.       IMPRESSION:  Findings compatible with acute cholecystitis.  Hepatic steatosis.    < end of copied text > (2019 21:13)      PAST MEDICAL & SURGICAL HISTORY:  Lupus (systemic lupus erythematosus)  Migraines  H/O:  section    Allergies    codeine (Unknown)    Intolerances        MEDICATIONS  (STANDING):  cefTRIAXone   IVPB 2 Gram(s) IV Intermittent every 24 hours  heparin  Injectable 5000 Unit(s) SubCutaneous every 8 hours  lactated ringers. 1000 milliLiter(s) (100 mL/Hr) IV Continuous <Continuous>  metroNIDAZOLE  IVPB 500 milliGRAM(s) IV Intermittent every 8 hours    MEDICATIONS  (PRN):  HYDROmorphone  Injectable 1 milliGRAM(s) IV Push every 4 hours PRN Severe Pain (7 - 10)  ondansetron Injectable 4 milliGRAM(s) IV Push every 6 hours PRN Nausea      REVIEW OF SYSTEMS:  12 point ROS negative except for that stated in the HPI    PHYSICAL EXAM:  Vitals past 24 Hours: T(C): 36.9 (06-10-19 @ 14:31), Max: 36.9 (06-10-19 @ 14:31)  HR: 80 (06-10-19 @ 15:51) (80 - 104)  BP: 111/58 (06-10-19 @ 15:51) (107/59 - 160/74)  RR: 12 (06-10-19 @ 15:51) (11 - 20)  SpO2: 94% (06-10-19 @ 15:51) (93% - 97%)	    Daily Height in cm: 152.4 (10 James 2019 00:07)    Daily     GEN: Groggy and drowsy   HEENT: Moist mucous membranes  Neck: No JVD  Cardiovascular: Regular rate and rhythm, +S1 S2. No murmurs, rubs, or gallops appreciated  Respiratory: Lungs clear to auscultation bilaterally  Gastrointestinal:  Soft, non-tender, non-distended, normoactive bowel sounds  Skin: No rashes, No ecchymoses, No cyanosis  Neurologic: Non-focal, alert and oriented x3.     I&O's Detail    2019 07:01  -  10 James 2019 07:00  --------------------------------------------------------  IN:  Total IN: 0 mL    OUT:    Voided: 450 mL  Total OUT: 450 mL    Total NET: -450 mL      10 James 2019 07:01  -  10 James 2019 16:11  --------------------------------------------------------  IN:    lactated ringers.: 200 mL  Total IN: 200 mL    OUT:  Total OUT: 0 mL    Total NET: 200 mL            LABS:                        13.7   8.66  )-----------( 312      ( 10 James 2019 07:22 )             43.2     06-10    137  |  100  |  10  ----------------------------<  131<H>  4.0   |  25  |  0.59    Ca    8.8      10 James 2019 07:22  Phos  3.0     06-10  Mg     1.9     10    TPro  7.5  /  Alb  3.9  /  TBili  1.2  /  DBili  x   /  AST  289<H>  /  ALT  227<H>  /  AlkPhos  154<H>  06-10    CARDIAC MARKERS ( 2019 14:46 )  x     / <0.01 ng/mL / 40 U/L / x     / <1.0 ng/mL      PT/INR - ( 10 James 2019 07:22 )   PT: 12.3 sec;   INR: 1.09          PTT - ( 10 James 2019 07:22 )  PTT:33.5 sec  Urinalysis Basic - ( 2019 15:11 )    Color: Yellow / Appearance: Clear / S.020 / pH: x  Gluc: x / Ketone: Trace mg/dL  / Bili: Negative / Urobili: 0.2 E.U./dL   Blood: x / Protein: 30 mg/dL / Nitrite: NEGATIVE   Leuk Esterase: Small / RBC: > 10 /HPF / WBC 5-10 /HPF   Sq Epi: x / Non Sq Epi: 5-10 /HPF / Bacteria: Present /HPF      I&O's Summary    2019 07:  -  10 James 2019 07:00  --------------------------------------------------------  IN: 0 mL / OUT: 450 mL / NET: -450 mL    10 James 2019 07:01  -  10 James 2019 16:11  --------------------------------------------------------  IN: 200 mL / OUT: 0 mL / NET: 200 mL        CARDIAC DIAGNOSTIC TESTING:    ECG: normal sinus rhythm     TELEMETRY: sinus rhythm     ECHO:  pending     RADIOLOGY & ADDITIONAL STUDIES:      ASSESSMENT/PLAN:  64 y/o F w hx of lupus/ vulvar cancer and no other cardiac history presented today for a lap choley for cholecystitis found to be in sinus tachycardia per primary anesthesiologist.     Sinus tachycardia   Etiology likely 2/2 to anxiety vs sepsis (cholangitis) vs pain   monitor on telemetry overnight   ECHO pending to rule out any structural abnormality   Treat the underlying cause   Will continue to follow     Emerald Fonseca MD   Cardiology fellow Patient is a 63y old  Female who presents with a chief complaint of Acute cholecystitis (10 James 2019 13:43)    62 y/o F w hx of lupus/ vulvar cancer and no other cardiac history presented today for a lap choley for cholecystitis. Cardiology consulted for tachycardia with a heart rate of 140s. Per primary anesthesiologist, patient was put on monitor and noted to be tachycardic to 140s, sinus tachycardia. No intra-operative tele strips. Post op - EKG WNL. Per patient's relative at bedside, she had been in pain the night prior and very anxious prior to the surgery. Once she was given esmolol intra op her HR improved, following which she was given MTP pushes. Post op, she is HD stable, waking up. Denied any chest pain, palpitations, shortness of breath.     Outpatient cardiologist is Dr. Sanz.   - no recent ischemic workup   - no recent evaluation for BiV     PAST MEDICAL & SURGICAL HISTORY:  Lupus (systemic lupus erythematosus)  Migraines  H/O:  section ()      Home Medications:  none    Allergies  codeine - nausea (no reaction w/ IV morphine)    Intolerances          SOCIAL HISTORY:  - never smoker  - no hx of illicit drug use      Vital Signs Last 24 Hrs  T(C): 36.8 (2019 18:05), Max: 36.8 (2019 14:21)  T(F): 98.2 (2019 18:05), Max: 98.2 (2019 14:21)  HR: 84 (2019 18:05) (76 - 106)  BP: 160/74 (2019 18:05) (150/82 - 160/74)  BP(mean): --  RR: 18 (2019 18:05) (18 - 18)  SpO2: 97% (2019 18:05) (96% - 97%)      PHYSICAL EXAM  Gen:  NAD, resting comfortably  Pulm:  no respiratory distress, nonlabored breathing  C/V: s1,s2 non-tachycardic, no murmurs  Abd: soft, + england sign, + tenderness in RUQ and epigastrium, moderate voluntary guarding of RUQ,  Extrem: nonedematous, warm and well-perfused, 2+ DP pulses        LABS:                        14.9   8.01  )-----------( 377      ( 2019 14:46 )             46.5         139  |  99  |  13  ----------------------------<  123<H>  4.3   |  28  |  0.79    Ca    9.6      2019 14:46    TPro  8.2  /  Alb  4.3  /  TBili  0.5  /  DBili  x   /  AST  16  /  ALT  13  /  AlkPhos  82      LIVER FUNCTIONS - ( 2019 14:46 )  Alb: 4.3 g/dL / Pro: 8.2 g/dL / ALK PHOS: 82 U/L / ALT: 13 U/L / AST: 16 U/L / GGT: x           PT/INR - ( 2019 14:46 )   PT: 12.3 sec;   INR: 1.09          PTT - ( 2019 14:46 )  PTT:34.2 sec  CAPILLARY BLOOD GLUCOSE        Urinalysis Basic - ( 2019 15:11 )    Color: Yellow / Appearance: Clear / S.020 / pH: x  Gluc: x / Ketone: Trace mg/dL  / Bili: Negative / Urobili: 0.2 E.U./dL   Blood: x / Protein: 30 mg/dL / Nitrite: NEGATIVE   Leuk Esterase: Small / RBC: > 10 /HPF / WBC 5-10 /HPF   Sq Epi: x / Non Sq Epi: 5-10 /HPF / Bacteria: Present /HPF            MEDICATIONS  (STANDING):  metroNIDAZOLE  IVPB 500 milliGRAM(s) IV Intermittent Once      RADIOLOGY & ADDITIONAL STUDIES      < from: US Abdomen Limited (19 @ 19:12) >    FINDINGS: Ultrasound evaluation of the right upper quadrant demonstrates   a 2.6 cm hepatic cyst with thin septation in the right lobe of the liver.   The liver is echogenic compatible with hepatic steatosis. No dilated   intrahepatic bile ducts are seen.   The common bile duct is normal in   diameter  measuring 0.5 cm. There is a nonmobile stone at the neck of the   gallbladder with associated gallbladder distention, wall thickening and   pericholecystic fluid. Sonographic England sign could not be assessed.   Large amount of sludge is seen within the gallbladder. Additional   gallstones are seen near the gallbladder fundus. The visualized portions   of the pancreas are unremarkable.   The visualized portions of the   abdominal aorta and inferior vena cava are normal in appearance.   The   right kidney is 10.4 cm in length and normal in appearance.       IMPRESSION:  Findings compatible with acute cholecystitis.  Hepatic steatosis.    < end of copied text > (2019 21:13)      PAST MEDICAL & SURGICAL HISTORY:  Lupus (systemic lupus erythematosus)  Migraines  H/O:  section    Allergies    codeine (Unknown)    Intolerances        MEDICATIONS  (STANDING):  cefTRIAXone   IVPB 2 Gram(s) IV Intermittent every 24 hours  heparin  Injectable 5000 Unit(s) SubCutaneous every 8 hours  lactated ringers. 1000 milliLiter(s) (100 mL/Hr) IV Continuous <Continuous>  metroNIDAZOLE  IVPB 500 milliGRAM(s) IV Intermittent every 8 hours    MEDICATIONS  (PRN):  HYDROmorphone  Injectable 1 milliGRAM(s) IV Push every 4 hours PRN Severe Pain (7 - 10)  ondansetron Injectable 4 milliGRAM(s) IV Push every 6 hours PRN Nausea      REVIEW OF SYSTEMS:  12 point ROS negative except for that stated in the HPI    PHYSICAL EXAM:  Vitals past 24 Hours: T(C): 36.9 (06-10-19 @ 14:31), Max: 36.9 (06-10-19 @ 14:31)  HR: 80 (06-10-19 @ 15:51) (80 - 104)  BP: 111/58 (06-10-19 @ 15:51) (107/59 - 160/74)  RR: 12 (06-10-19 @ 15:51) (11 - 20)  SpO2: 94% (06-10-19 @ 15:51) (93% - 97%)	    Daily Height in cm: 152.4 (10 James 2019 00:07)    Daily     GEN: Groggy and drowsy   HEENT: Moist mucous membranes  Neck: No JVD  Cardiovascular: Regular rate and rhythm, +S1 S2. No murmurs, rubs, or gallops appreciated  Respiratory: Lungs clear to auscultation bilaterally  Gastrointestinal:  Soft, non-tender, non-distended, normoactive bowel sounds  Skin: No rashes, No ecchymoses, No cyanosis  Neurologic: Non-focal, alert and oriented x3.     I&O's Detail    2019 07:01  -  10 James 2019 07:00  --------------------------------------------------------  IN:  Total IN: 0 mL    OUT:    Voided: 450 mL  Total OUT: 450 mL    Total NET: -450 mL      10 James 2019 07:01  -  10 James 2019 16:11  --------------------------------------------------------  IN:    lactated ringers.: 200 mL  Total IN: 200 mL    OUT:  Total OUT: 0 mL    Total NET: 200 mL            LABS:                        13.7   8.66  )-----------( 312      ( 10 James 2019 07:22 )             43.2     0610    137  |  100  |  10  ----------------------------<  131<H>  4.0   |  25  |  0.59    Ca    8.8      10 James 2019 07:22  Phos  3.0     -10  Mg     1.9     06-10    TPro  7.5  /  Alb  3.9  /  TBili  1.2  /  DBili  x   /  AST  289<H>  /  ALT  227<H>  /  AlkPhos  154<H>  10    CARDIAC MARKERS ( 2019 14:46 )  x     / <0.01 ng/mL / 40 U/L / x     / <1.0 ng/mL      PT/INR - ( 10 James 2019 07:22 )   PT: 12.3 sec;   INR: 1.09          PTT - ( 10 James 2019 07:22 )  PTT:33.5 sec  Urinalysis Basic - ( 2019 15:11 )    Color: Yellow / Appearance: Clear / S.020 / pH: x  Gluc: x / Ketone: Trace mg/dL  / Bili: Negative / Urobili: 0.2 E.U./dL   Blood: x / Protein: 30 mg/dL / Nitrite: NEGATIVE   Leuk Esterase: Small / RBC: > 10 /HPF / WBC 5-10 /HPF   Sq Epi: x / Non Sq Epi: 5-10 /HPF / Bacteria: Present /HPF      I&O's Summary    2019 07:01  -  10 James 2019 07:00  --------------------------------------------------------  IN: 0 mL / OUT: 450 mL / NET: -450 mL    10 James 2019 07:01  -  10 James 2019 16:11  --------------------------------------------------------  IN: 200 mL / OUT: 0 mL / NET: 200 mL        CARDIAC DIAGNOSTIC TESTING:    ECG: normal sinus rhythm     TELEMETRY: sinus rhythm     ECHO:  pending     RADIOLOGY & ADDITIONAL STUDIES:      ASSESSMENT/PLAN:  62 y/o F w hx of lupus/ vulvar cancer and no other cardiac history presented today for a lap choley for cholecystitis found to be in sinus tachycardia per primary anesthesiologist.     Sinus tachycardia   Etiology likely 2/2 to anxiety vs sepsis (cholangitis) vs pain   monitor on telemetry overnight   ECHO pending to rule out any structural abnormality   Treat the underlying cause   Will continue to follow     Emerald Fonseca MD   Cardiology fellow Patient is a 63y old  Female who presents with a chief complaint of Acute cholecystitis (10 James 2019 13:43)    64 y/o F w hx of lupus/ vulvar cancer and no other cardiac history presented today for a lap choley for cholecystitis. Cardiology consulted for tachycardia with a heart rate of 140s. Per primary anesthesiologist, patient was put on monitor and noted to be tachycardic to 140s, sinus tachycardia. No intra-operative tele strips. Post op - EKG WNL. Per patient's relative at bedside, she had been in pain the night prior and very anxious prior to the surgery. Once she was given esmolol intra op her HR improved, following which she was given MTP pushes. Post op, she is HD stable, waking up. Denied any chest pain, palpitations, shortness of breath.     PAST MEDICAL & SURGICAL HISTORY:  Lupus (systemic lupus erythematosus)  Migraines  H/O:  section ()      Home Medications:  none    Allergies  codeine - nausea (no reaction w/ IV morphine)    Intolerances          SOCIAL HISTORY:  - never smoker  - no hx of illicit drug use      Vital Signs Last 24 Hrs  T(C): 36.8 (2019 18:05), Max: 36.8 (2019 14:21)  T(F): 98.2 (2019 18:05), Max: 98.2 (2019 14:21)  HR: 84 (2019 18:05) (76 - 106)  BP: 160/74 (2019 18:05) (150/82 - 160/74)  BP(mean): --  RR: 18 (2019 18:05) (18 - 18)  SpO2: 97% (2019 18:05) (96% - 97%)      PHYSICAL EXAM  Gen:  NAD, resting comfortably  Pulm:  no respiratory distress, nonlabored breathing  C/V: s1,s2 non-tachycardic, no murmurs  Abd: soft, + england sign, + tenderness in RUQ and epigastrium, moderate voluntary guarding of RUQ,  Extrem: nonedematous, warm and well-perfused, 2+ DP pulses        LABS:                        14.9   8.01  )-----------( 377      ( 2019 14:46 )             46.5     06-09    139  |  99  |  13  ----------------------------<  123<H>  4.3   |  28  |  0.79    Ca    9.6      2019 14:46    TPro  8.2  /  Alb  4.3  /  TBili  0.5  /  DBili  x   /  AST  16  /  ALT  13  /  AlkPhos  82  06-09    LIVER FUNCTIONS - ( 2019 14:46 )  Alb: 4.3 g/dL / Pro: 8.2 g/dL / ALK PHOS: 82 U/L / ALT: 13 U/L / AST: 16 U/L / GGT: x           PT/INR - ( 2019 14:46 )   PT: 12.3 sec;   INR: 1.09          PTT - ( 2019 14:46 )  PTT:34.2 sec  CAPILLARY BLOOD GLUCOSE        Urinalysis Basic - ( 2019 15:11 )    Color: Yellow / Appearance: Clear / S.020 / pH: x  Gluc: x / Ketone: Trace mg/dL  / Bili: Negative / Urobili: 0.2 E.U./dL   Blood: x / Protein: 30 mg/dL / Nitrite: NEGATIVE   Leuk Esterase: Small / RBC: > 10 /HPF / WBC 5-10 /HPF   Sq Epi: x / Non Sq Epi: 5-10 /HPF / Bacteria: Present /HPF            MEDICATIONS  (STANDING):  metroNIDAZOLE  IVPB 500 milliGRAM(s) IV Intermittent Once      RADIOLOGY & ADDITIONAL STUDIES      < from: US Abdomen Limited (19 @ 19:12) >    FINDINGS: Ultrasound evaluation of the right upper quadrant demonstrates   a 2.6 cm hepatic cyst with thin septation in the right lobe of the liver.   The liver is echogenic compatible with hepatic steatosis. No dilated   intrahepatic bile ducts are seen.   The common bile duct is normal in   diameter  measuring 0.5 cm. There is a nonmobile stone at the neck of the   gallbladder with associated gallbladder distention, wall thickening and   pericholecystic fluid. Sonographic England sign could not be assessed.   Large amount of sludge is seen within the gallbladder. Additional   gallstones are seen near the gallbladder fundus. The visualized portions   of the pancreas are unremarkable.   The visualized portions of the   abdominal aorta and inferior vena cava are normal in appearance.   The   right kidney is 10.4 cm in length and normal in appearance.       IMPRESSION:  Findings compatible with acute cholecystitis.  Hepatic steatosis.    < end of copied text > (2019 21:13)      PAST MEDICAL & SURGICAL HISTORY:  Lupus (systemic lupus erythematosus)  Migraines  H/O:  section    Allergies    codeine (Unknown)    Intolerances        MEDICATIONS  (STANDING):  cefTRIAXone   IVPB 2 Gram(s) IV Intermittent every 24 hours  heparin  Injectable 5000 Unit(s) SubCutaneous every 8 hours  lactated ringers. 1000 milliLiter(s) (100 mL/Hr) IV Continuous <Continuous>  metroNIDAZOLE  IVPB 500 milliGRAM(s) IV Intermittent every 8 hours    MEDICATIONS  (PRN):  HYDROmorphone  Injectable 1 milliGRAM(s) IV Push every 4 hours PRN Severe Pain (7 - 10)  ondansetron Injectable 4 milliGRAM(s) IV Push every 6 hours PRN Nausea      REVIEW OF SYSTEMS:  12 point ROS negative except for that stated in the HPI    PHYSICAL EXAM:  Vitals past 24 Hours: T(C): 36.9 (06-10-19 @ 14:31), Max: 36.9 (06-10-19 @ 14:31)  HR: 80 (06-10-19 @ 15:51) (80 - 104)  BP: 111/58 (06-10-19 @ 15:51) (107/59 - 160/74)  RR: 12 (06-10-19 @ 15:51) (11 - 20)  SpO2: 94% (06-10-19 @ 15:51) (93% - 97%)	    Daily Height in cm: 152.4 (10 James 2019 00:07)    Daily     GEN: Groggy and drowsy   HEENT: Moist mucous membranes  Neck: No JVD  Cardiovascular: Regular rate and rhythm, +S1 S2. No murmurs, rubs, or gallops appreciated  Respiratory: Lungs clear to auscultation bilaterally  Gastrointestinal:  Soft, non-tender, non-distended, normoactive bowel sounds  Skin: No rashes, No ecchymoses, No cyanosis  Neurologic: Non-focal, alert and oriented x3.     I&O's Detail    2019 07:01  -  10 James 2019 07:00  --------------------------------------------------------  IN:  Total IN: 0 mL    OUT:    Voided: 450 mL  Total OUT: 450 mL    Total NET: -450 mL      10 James 2019 07:01  -  10 James 2019 16:11  --------------------------------------------------------  IN:    lactated ringers.: 200 mL  Total IN: 200 mL    OUT:  Total OUT: 0 mL    Total NET: 200 mL            LABS:                        13.7   8.66  )-----------( 312      ( 10 James 2019 07:22 )             43.2     06-10    137  |  100  |  10  ----------------------------<  131<H>  4.0   |  25  |  0.59    Ca    8.8      10 James 2019 07:22  Phos  3.0     06-10  Mg     1.9     10    TPro  7.5  /  Alb  3.9  /  TBili  1.2  /  DBili  x   /  AST  289<H>  /  ALT  227<H>  /  AlkPhos  154<H>  06-10    CARDIAC MARKERS ( 2019 14:46 )  x     / <0.01 ng/mL / 40 U/L / x     / <1.0 ng/mL      PT/INR - ( 10 James 2019 07:22 )   PT: 12.3 sec;   INR: 1.09          PTT - ( 10 James 2019 07:22 )  PTT:33.5 sec  Urinalysis Basic - ( 2019 15:11 )    Color: Yellow / Appearance: Clear / S.020 / pH: x  Gluc: x / Ketone: Trace mg/dL  / Bili: Negative / Urobili: 0.2 E.U./dL   Blood: x / Protein: 30 mg/dL / Nitrite: NEGATIVE   Leuk Esterase: Small / RBC: > 10 /HPF / WBC 5-10 /HPF   Sq Epi: x / Non Sq Epi: 5-10 /HPF / Bacteria: Present /HPF      I&O's Summary    2019 07:  -  10 James 2019 07:00  --------------------------------------------------------  IN: 0 mL / OUT: 450 mL / NET: -450 mL    10 James 2019 07:01  -  10 James 2019 16:11  --------------------------------------------------------  IN: 200 mL / OUT: 0 mL / NET: 200 mL        CARDIAC DIAGNOSTIC TESTING:    ECG: normal sinus rhythm     TELEMETRY: sinus rhythm     ECHO:  pending     RADIOLOGY & ADDITIONAL STUDIES:      ASSESSMENT/PLAN:  64 y/o F w hx of lupus/ vulvar cancer and no other cardiac history presented today for a lap choley for cholecystitis found to be in sinus tachycardia per primary anesthesiologist.     Sinus tachycardia   Etiology likely 2/2 to anxiety vs sepsis (cholangitis) vs pain   monitor on telemetry overnight   ECHO pending to rule out any structural abnormality   Treat the underlying cause   Will continue to follow     Emerald Fonseca MD   Cardiology fellow

## 2019-06-10 NOTE — PACU DISCHARGE NOTE - COMMENTS
Patient discharged from PACU after meeting criteria. Pt arousable. States nausea is much better after IV compazine. Report given to 8L RN Jack. Pt A&Ox3. RRWNL on 3L NC. CM showing SR. R AC HL 18g, patent.  ABD obese. Lap sites x3 with derma bond. Pt voided and is now tolerating ice chips. Pt t/f on bed monitored with IVF in progress..

## 2019-06-10 NOTE — BRIEF OPERATIVE NOTE - OPERATION/FINDINGS
Significantly inflamed gallbladder with wall thickening, edema, and surrounding free fluid. Gallbladder decompressed. Cystic duct and artery identified and ligated with clips x 3 each. Hemostasis achieved. Abdomen irrigated. Surgiflow applied to gallbladder fossa. Sri-umbilical incision fascia closed with figure-of-eight x 3. Skin closed primarily.

## 2019-06-10 NOTE — CONSULT NOTE ADULT - ATTENDING COMMENTS
Examined the patient at beside with the fellow.   No telemetry strips available to interpret.   As per report episode of sinus tchaycardia prior to surgery.   When examined the patient is waking up from anesthesia.   VS, EKG, telemetry wnl  EKG: SR, short TN     Agree with assessment and plan as above  -cont to monitor on telemetry   -echo  -maintain euvolemic, normotensive, pain control

## 2019-06-11 LAB
ALBUMIN SERPL ELPH-MCNC: 2.9 G/DL — LOW (ref 3.3–5)
ALP SERPL-CCNC: 179 U/L — HIGH (ref 40–120)
ALT FLD-CCNC: 324 U/L — HIGH (ref 10–45)
ANION GAP SERPL CALC-SCNC: 11 MMOL/L — SIGNIFICANT CHANGE UP (ref 5–17)
AST SERPL-CCNC: 233 U/L — HIGH (ref 10–40)
BILIRUB SERPL-MCNC: 0.4 MG/DL — SIGNIFICANT CHANGE UP (ref 0.2–1.2)
BUN SERPL-MCNC: 18 MG/DL — SIGNIFICANT CHANGE UP (ref 7–23)
CALCIUM SERPL-MCNC: 8.5 MG/DL — SIGNIFICANT CHANGE UP (ref 8.4–10.5)
CHLORIDE SERPL-SCNC: 100 MMOL/L — SIGNIFICANT CHANGE UP (ref 96–108)
CO2 SERPL-SCNC: 24 MMOL/L — SIGNIFICANT CHANGE UP (ref 22–31)
CREAT SERPL-MCNC: 0.68 MG/DL — SIGNIFICANT CHANGE UP (ref 0.5–1.3)
CULTURE RESULTS: NO GROWTH — SIGNIFICANT CHANGE UP
GLUCOSE SERPL-MCNC: 136 MG/DL — HIGH (ref 70–99)
HCT VFR BLD CALC: 35.4 % — SIGNIFICANT CHANGE UP (ref 34.5–45)
HGB BLD-MCNC: 11 G/DL — LOW (ref 11.5–15.5)
MAGNESIUM SERPL-MCNC: 2 MG/DL — SIGNIFICANT CHANGE UP (ref 1.6–2.6)
MCHC RBC-ENTMCNC: 29.1 PG — SIGNIFICANT CHANGE UP (ref 27–34)
MCHC RBC-ENTMCNC: 31.1 GM/DL — LOW (ref 32–36)
MCV RBC AUTO: 93.7 FL — SIGNIFICANT CHANGE UP (ref 80–100)
NRBC # BLD: 0 /100 WBCS — SIGNIFICANT CHANGE UP (ref 0–0)
PHOSPHATE SERPL-MCNC: 3.2 MG/DL — SIGNIFICANT CHANGE UP (ref 2.5–4.5)
PLATELET # BLD AUTO: 282 K/UL — SIGNIFICANT CHANGE UP (ref 150–400)
POTASSIUM SERPL-MCNC: 4 MMOL/L — SIGNIFICANT CHANGE UP (ref 3.5–5.3)
POTASSIUM SERPL-SCNC: 4 MMOL/L — SIGNIFICANT CHANGE UP (ref 3.5–5.3)
PROT SERPL-MCNC: 6.3 G/DL — SIGNIFICANT CHANGE UP (ref 6–8.3)
RBC # BLD: 3.78 M/UL — LOW (ref 3.8–5.2)
RBC # FLD: 13.2 % — SIGNIFICANT CHANGE UP (ref 10.3–14.5)
SODIUM SERPL-SCNC: 135 MMOL/L — SIGNIFICANT CHANGE UP (ref 135–145)
SPECIMEN SOURCE: SIGNIFICANT CHANGE UP
WBC # BLD: 9.67 K/UL — SIGNIFICANT CHANGE UP (ref 3.8–10.5)
WBC # FLD AUTO: 9.67 K/UL — SIGNIFICANT CHANGE UP (ref 3.8–10.5)

## 2019-06-11 PROCEDURE — 99233 SBSQ HOSP IP/OBS HIGH 50: CPT

## 2019-06-11 PROCEDURE — 93306 TTE W/DOPPLER COMPLETE: CPT | Mod: 26

## 2019-06-11 RX ORDER — CEFTRIAXONE 500 MG/1
2 INJECTION, POWDER, FOR SOLUTION INTRAMUSCULAR; INTRAVENOUS EVERY 24 HOURS
Refills: 0 | Status: DISCONTINUED | OUTPATIENT
Start: 2019-06-12 | End: 2019-06-13

## 2019-06-11 RX ORDER — ACETAMINOPHEN 500 MG
650 TABLET ORAL EVERY 6 HOURS
Refills: 0 | Status: DISCONTINUED | OUTPATIENT
Start: 2019-06-11 | End: 2019-06-13

## 2019-06-11 RX ORDER — METRONIDAZOLE 500 MG
500 TABLET ORAL EVERY 8 HOURS
Refills: 0 | Status: DISCONTINUED | OUTPATIENT
Start: 2019-06-11 | End: 2019-06-13

## 2019-06-11 RX ORDER — METOCLOPRAMIDE HCL 10 MG
10 TABLET ORAL ONCE
Refills: 0 | Status: DISCONTINUED | OUTPATIENT
Start: 2019-06-11 | End: 2019-06-13

## 2019-06-11 RX ADMIN — HEPARIN SODIUM 5000 UNIT(S): 5000 INJECTION INTRAVENOUS; SUBCUTANEOUS at 21:34

## 2019-06-11 RX ADMIN — HYDROMORPHONE HYDROCHLORIDE 1 MILLIGRAM(S): 2 INJECTION INTRAMUSCULAR; INTRAVENOUS; SUBCUTANEOUS at 01:22

## 2019-06-11 RX ADMIN — Medication 100 MILLIGRAM(S): at 21:34

## 2019-06-11 RX ADMIN — CEFTRIAXONE 100 GRAM(S): 500 INJECTION, POWDER, FOR SOLUTION INTRAMUSCULAR; INTRAVENOUS at 01:22

## 2019-06-11 RX ADMIN — HYDROMORPHONE HYDROCHLORIDE 1 MILLIGRAM(S): 2 INJECTION INTRAMUSCULAR; INTRAVENOUS; SUBCUTANEOUS at 06:33

## 2019-06-11 RX ADMIN — HEPARIN SODIUM 5000 UNIT(S): 5000 INJECTION INTRAVENOUS; SUBCUTANEOUS at 13:22

## 2019-06-11 RX ADMIN — Medication 100 MILLIGRAM(S): at 06:27

## 2019-06-11 RX ADMIN — HEPARIN SODIUM 5000 UNIT(S): 5000 INJECTION INTRAVENOUS; SUBCUTANEOUS at 06:27

## 2019-06-11 RX ADMIN — ONDANSETRON 4 MILLIGRAM(S): 8 TABLET, FILM COATED ORAL at 11:04

## 2019-06-11 RX ADMIN — ONDANSETRON 4 MILLIGRAM(S): 8 TABLET, FILM COATED ORAL at 01:22

## 2019-06-11 RX ADMIN — HYDROMORPHONE HYDROCHLORIDE 1 MILLIGRAM(S): 2 INJECTION INTRAMUSCULAR; INTRAVENOUS; SUBCUTANEOUS at 15:04

## 2019-06-11 RX ADMIN — HYDROMORPHONE HYDROCHLORIDE 1 MILLIGRAM(S): 2 INJECTION INTRAMUSCULAR; INTRAVENOUS; SUBCUTANEOUS at 16:48

## 2019-06-11 RX ADMIN — ONDANSETRON 4 MILLIGRAM(S): 8 TABLET, FILM COATED ORAL at 18:59

## 2019-06-11 RX ADMIN — HYDROMORPHONE HYDROCHLORIDE 1 MILLIGRAM(S): 2 INJECTION INTRAMUSCULAR; INTRAVENOUS; SUBCUTANEOUS at 02:24

## 2019-06-11 RX ADMIN — Medication 100 MILLIGRAM(S): at 13:21

## 2019-06-11 NOTE — PROGRESS NOTE ADULT - ATTENDING COMMENTS
Examine the patient and this morning at bedside with the fellow  In no distress  Telemetry reviewed significant for 3 beats of atrial tachycardia otherwise within normal limits.  Echo reviewed EF 65-70%, moderate TR, mild MR, moderate pulmonary hypertension (PAPS 50 mmHg)     -in the setting of lack of symptoms, no significant events on telemetry overnight other than few beats of atrial tachycardia, self-resolved, would recommend outpatient followup in the next couple weeks in order to asses symptoms, consider repeating echo and possible Holter monitoring

## 2019-06-12 LAB
ALBUMIN SERPL ELPH-MCNC: 3 G/DL — LOW (ref 3.3–5)
ALP SERPL-CCNC: 124 U/L — HIGH (ref 40–120)
ALT FLD-CCNC: 204 U/L — HIGH (ref 10–45)
ANION GAP SERPL CALC-SCNC: 7 MMOL/L — SIGNIFICANT CHANGE UP (ref 5–17)
AST SERPL-CCNC: 78 U/L — HIGH (ref 10–40)
BILIRUB SERPL-MCNC: 0.2 MG/DL — SIGNIFICANT CHANGE UP (ref 0.2–1.2)
BUN SERPL-MCNC: 10 MG/DL — SIGNIFICANT CHANGE UP (ref 7–23)
CALCIUM SERPL-MCNC: 8.3 MG/DL — LOW (ref 8.4–10.5)
CHLORIDE SERPL-SCNC: 105 MMOL/L — SIGNIFICANT CHANGE UP (ref 96–108)
CO2 SERPL-SCNC: 28 MMOL/L — SIGNIFICANT CHANGE UP (ref 22–31)
CREAT SERPL-MCNC: 0.68 MG/DL — SIGNIFICANT CHANGE UP (ref 0.5–1.3)
GLUCOSE SERPL-MCNC: 97 MG/DL — SIGNIFICANT CHANGE UP (ref 70–99)
HCT VFR BLD CALC: 33 % — LOW (ref 34.5–45)
HGB BLD-MCNC: 10.3 G/DL — LOW (ref 11.5–15.5)
MAGNESIUM SERPL-MCNC: 2 MG/DL — SIGNIFICANT CHANGE UP (ref 1.6–2.6)
MCHC RBC-ENTMCNC: 29.7 PG — SIGNIFICANT CHANGE UP (ref 27–34)
MCHC RBC-ENTMCNC: 31.2 GM/DL — LOW (ref 32–36)
MCV RBC AUTO: 95.1 FL — SIGNIFICANT CHANGE UP (ref 80–100)
NRBC # BLD: 0 /100 WBCS — SIGNIFICANT CHANGE UP (ref 0–0)
PHOSPHATE SERPL-MCNC: 1.8 MG/DL — LOW (ref 2.5–4.5)
PLATELET # BLD AUTO: 273 K/UL — SIGNIFICANT CHANGE UP (ref 150–400)
POTASSIUM SERPL-MCNC: 4 MMOL/L — SIGNIFICANT CHANGE UP (ref 3.5–5.3)
POTASSIUM SERPL-SCNC: 4 MMOL/L — SIGNIFICANT CHANGE UP (ref 3.5–5.3)
PROT SERPL-MCNC: 5.9 G/DL — LOW (ref 6–8.3)
RBC # BLD: 3.47 M/UL — LOW (ref 3.8–5.2)
RBC # FLD: 13.6 % — SIGNIFICANT CHANGE UP (ref 10.3–14.5)
SODIUM SERPL-SCNC: 140 MMOL/L — SIGNIFICANT CHANGE UP (ref 135–145)
WBC # BLD: 6.75 K/UL — SIGNIFICANT CHANGE UP (ref 3.8–10.5)
WBC # FLD AUTO: 6.75 K/UL — SIGNIFICANT CHANGE UP (ref 3.8–10.5)

## 2019-06-12 RX ORDER — KETOROLAC TROMETHAMINE 30 MG/ML
10 SYRINGE (ML) INJECTION EVERY 6 HOURS
Refills: 0 | Status: DISCONTINUED | OUTPATIENT
Start: 2019-06-12 | End: 2019-06-13

## 2019-06-12 RX ORDER — POTASSIUM PHOSPHATE, MONOBASIC POTASSIUM PHOSPHATE, DIBASIC 236; 224 MG/ML; MG/ML
15 INJECTION, SOLUTION INTRAVENOUS ONCE
Refills: 0 | Status: COMPLETED | OUTPATIENT
Start: 2019-06-12 | End: 2019-06-12

## 2019-06-12 RX ORDER — FAMOTIDINE 10 MG/ML
20 INJECTION INTRAVENOUS ONCE
Refills: 0 | Status: COMPLETED | OUTPATIENT
Start: 2019-06-12 | End: 2019-06-12

## 2019-06-12 RX ORDER — FAMOTIDINE 10 MG/ML
20 INJECTION INTRAVENOUS ONCE
Refills: 0 | Status: DISCONTINUED | OUTPATIENT
Start: 2019-06-12 | End: 2019-06-12

## 2019-06-12 RX ORDER — PANTOPRAZOLE SODIUM 20 MG/1
40 TABLET, DELAYED RELEASE ORAL
Refills: 0 | Status: DISCONTINUED | OUTPATIENT
Start: 2019-06-12 | End: 2019-06-13

## 2019-06-12 RX ADMIN — Medication 100 MILLIGRAM(S): at 21:32

## 2019-06-12 RX ADMIN — Medication 650 MILLIGRAM(S): at 18:45

## 2019-06-12 RX ADMIN — Medication 62.5 MILLIMOLE(S): at 18:00

## 2019-06-12 RX ADMIN — Medication 10 MILLIGRAM(S): at 12:41

## 2019-06-12 RX ADMIN — Medication 650 MILLIGRAM(S): at 05:05

## 2019-06-12 RX ADMIN — Medication 100 MILLIGRAM(S): at 05:06

## 2019-06-12 RX ADMIN — HEPARIN SODIUM 5000 UNIT(S): 5000 INJECTION INTRAVENOUS; SUBCUTANEOUS at 21:32

## 2019-06-12 RX ADMIN — Medication 650 MILLIGRAM(S): at 00:22

## 2019-06-12 RX ADMIN — HEPARIN SODIUM 5000 UNIT(S): 5000 INJECTION INTRAVENOUS; SUBCUTANEOUS at 05:05

## 2019-06-12 RX ADMIN — Medication 650 MILLIGRAM(S): at 01:00

## 2019-06-12 RX ADMIN — Medication 650 MILLIGRAM(S): at 12:13

## 2019-06-12 RX ADMIN — Medication 650 MILLIGRAM(S): at 05:35

## 2019-06-12 RX ADMIN — HEPARIN SODIUM 5000 UNIT(S): 5000 INJECTION INTRAVENOUS; SUBCUTANEOUS at 15:22

## 2019-06-12 RX ADMIN — ONDANSETRON 4 MILLIGRAM(S): 8 TABLET, FILM COATED ORAL at 10:15

## 2019-06-12 RX ADMIN — Medication 650 MILLIGRAM(S): at 11:13

## 2019-06-12 RX ADMIN — Medication 650 MILLIGRAM(S): at 19:45

## 2019-06-12 RX ADMIN — FAMOTIDINE 20 MILLIGRAM(S): 10 INJECTION INTRAVENOUS at 18:26

## 2019-06-12 RX ADMIN — POTASSIUM PHOSPHATE, MONOBASIC POTASSIUM PHOSPHATE, DIBASIC 63.75 MILLIMOLE(S): 236; 224 INJECTION, SOLUTION INTRAVENOUS at 11:13

## 2019-06-12 RX ADMIN — Medication 100 MILLIGRAM(S): at 15:22

## 2019-06-12 RX ADMIN — CEFTRIAXONE 100 GRAM(S): 500 INJECTION, POWDER, FOR SOLUTION INTRAMUSCULAR; INTRAVENOUS at 00:22

## 2019-06-12 RX ADMIN — Medication 10 MILLIGRAM(S): at 19:39

## 2019-06-12 RX ADMIN — Medication 10 MILLIGRAM(S): at 11:41

## 2019-06-12 RX ADMIN — Medication 10 MILLIGRAM(S): at 18:39

## 2019-06-12 NOTE — PHYSICAL THERAPY INITIAL EVALUATION ADULT - LEVEL OF INDEPENDENCE: SUPINE/SIT, REHAB EVAL
Pt raised HOB raised to 45* to aid in bed mobility 2/2 pain/supervision Pt raised HOB to 45* to aid in bed mobility 2/2 pain/independent

## 2019-06-12 NOTE — PHYSICAL THERAPY INITIAL EVALUATION ADULT - PERTINENT HX OF CURRENT PROBLEM, REHAB EVAL
64 yo F w/ hx of Lupus (not on medication) associated w/ frequent migraines, and vulvar cancer s/p partial vulvectomy (2018), presenting 6/9 to [facility] ED w/ 2 day hx of upper abdominal pain associated w/ intermittent nausea but no vomiting.

## 2019-06-12 NOTE — PHYSICAL THERAPY INITIAL EVALUATION ADULT - ADDITIONAL COMMENTS
Pt. reports being independent in all ADLs and functional mobility PTA. Pt lives with parents. Pt takes subways to and from work. Pt reports son will be caring for her following d/c and will be taking a few days off from work while she gets better. Pt. reports being independent in all ADLs and functional mobility PTA. Pt lives with parents. Pt takes subways to and from work. Pt reports son will be caring for her following d/c and will be taking a few days off from work while she gets better. Pt has no stairs to enter home, and 1 flight of stairs up to bedroom, however states that she will reside on first floor bedroom while she is recovering.

## 2019-06-12 NOTE — PHYSICAL THERAPY INITIAL EVALUATION ADULT - GAIT DEVIATIONS NOTED, PT EVAL
increased time in double stance/decreased stride length/Pt. was noted to braced incision site during amb 2/2 pain/decreased luis e/decreased step length/increased stride width

## 2019-06-12 NOTE — PHYSICAL THERAPY INITIAL EVALUATION ADULT - PHYSICAL ASSIST/NONPHYSICAL ASSIST: GAIT, REHAB EVAL
verbal cues/supervision/VC needed to maintain straight path VC needed to maintain straight path and for deep breathing to help ease pain/verbal cues/supervision VC needed to maintain straight path and for deep breathing to help ease pain/verbal cues

## 2019-06-12 NOTE — PHYSICAL THERAPY INITIAL EVALUATION ADULT - ACTIVE RANGE OF MOTION EXAMINATION, REHAB EVAL
bilateral  lower extremity Active ROM was WFL (within functional limits)/bilateral upper extremity Active ROM was WFL (within functional limits)/As demonstrated by pt's ability to ambulate and negotiate stairs

## 2019-06-13 ENCOUNTER — INBOUND DOCUMENT (OUTPATIENT)
Age: 64
End: 2019-06-13

## 2019-06-13 ENCOUNTER — TRANSCRIPTION ENCOUNTER (OUTPATIENT)
Age: 64
End: 2019-06-13

## 2019-06-13 VITALS
DIASTOLIC BLOOD PRESSURE: 86 MMHG | OXYGEN SATURATION: 95 % | TEMPERATURE: 96 F | RESPIRATION RATE: 16 BRPM | HEART RATE: 73 BPM | SYSTOLIC BLOOD PRESSURE: 145 MMHG

## 2019-06-13 LAB
ANION GAP SERPL CALC-SCNC: 7 MMOL/L — SIGNIFICANT CHANGE UP (ref 5–17)
BUN SERPL-MCNC: 6 MG/DL — LOW (ref 7–23)
CALCIUM SERPL-MCNC: 8.5 MG/DL — SIGNIFICANT CHANGE UP (ref 8.4–10.5)
CHLORIDE SERPL-SCNC: 101 MMOL/L — SIGNIFICANT CHANGE UP (ref 96–108)
CO2 SERPL-SCNC: 27 MMOL/L — SIGNIFICANT CHANGE UP (ref 22–31)
CREAT SERPL-MCNC: 0.59 MG/DL — SIGNIFICANT CHANGE UP (ref 0.5–1.3)
GLUCOSE SERPL-MCNC: 92 MG/DL — SIGNIFICANT CHANGE UP (ref 70–99)
HCT VFR BLD CALC: 34 % — LOW (ref 34.5–45)
HGB BLD-MCNC: 10.9 G/DL — LOW (ref 11.5–15.5)
MAGNESIUM SERPL-MCNC: 1.9 MG/DL — SIGNIFICANT CHANGE UP (ref 1.6–2.6)
MCHC RBC-ENTMCNC: 29.7 PG — SIGNIFICANT CHANGE UP (ref 27–34)
MCHC RBC-ENTMCNC: 32.1 GM/DL — SIGNIFICANT CHANGE UP (ref 32–36)
MCV RBC AUTO: 92.6 FL — SIGNIFICANT CHANGE UP (ref 80–100)
NRBC # BLD: 0 /100 WBCS — SIGNIFICANT CHANGE UP (ref 0–0)
PHOSPHATE SERPL-MCNC: 2.8 MG/DL — SIGNIFICANT CHANGE UP (ref 2.5–4.5)
PLATELET # BLD AUTO: 310 K/UL — SIGNIFICANT CHANGE UP (ref 150–400)
POTASSIUM SERPL-MCNC: 3.3 MMOL/L — LOW (ref 3.5–5.3)
POTASSIUM SERPL-SCNC: 3.3 MMOL/L — LOW (ref 3.5–5.3)
RBC # BLD: 3.67 M/UL — LOW (ref 3.8–5.2)
RBC # FLD: 13.2 % — SIGNIFICANT CHANGE UP (ref 10.3–14.5)
SODIUM SERPL-SCNC: 135 MMOL/L — SIGNIFICANT CHANGE UP (ref 135–145)
WBC # BLD: 6.98 K/UL — SIGNIFICANT CHANGE UP (ref 3.8–10.5)
WBC # FLD AUTO: 6.98 K/UL — SIGNIFICANT CHANGE UP (ref 3.8–10.5)

## 2019-06-13 PROCEDURE — 96374 THER/PROPH/DIAG INJ IV PUSH: CPT | Mod: XU

## 2019-06-13 PROCEDURE — 86900 BLOOD TYPING SEROLOGIC ABO: CPT

## 2019-06-13 PROCEDURE — 96375 TX/PRO/DX INJ NEW DRUG ADDON: CPT

## 2019-06-13 PROCEDURE — 87040 BLOOD CULTURE FOR BACTERIA: CPT

## 2019-06-13 PROCEDURE — 85025 COMPLETE CBC W/AUTO DIFF WBC: CPT

## 2019-06-13 PROCEDURE — 82553 CREATINE MB FRACTION: CPT

## 2019-06-13 PROCEDURE — 87086 URINE CULTURE/COLONY COUNT: CPT

## 2019-06-13 PROCEDURE — 99285 EMERGENCY DEPT VISIT HI MDM: CPT | Mod: 25

## 2019-06-13 PROCEDURE — 81001 URINALYSIS AUTO W/SCOPE: CPT

## 2019-06-13 PROCEDURE — 83690 ASSAY OF LIPASE: CPT

## 2019-06-13 PROCEDURE — 85027 COMPLETE CBC AUTOMATED: CPT

## 2019-06-13 PROCEDURE — 86803 HEPATITIS C AB TEST: CPT

## 2019-06-13 PROCEDURE — 84484 ASSAY OF TROPONIN QUANT: CPT

## 2019-06-13 PROCEDURE — 96376 TX/PRO/DX INJ SAME DRUG ADON: CPT

## 2019-06-13 PROCEDURE — 82550 ASSAY OF CK (CPK): CPT

## 2019-06-13 PROCEDURE — 84100 ASSAY OF PHOSPHORUS: CPT

## 2019-06-13 PROCEDURE — 86850 RBC ANTIBODY SCREEN: CPT

## 2019-06-13 PROCEDURE — 93306 TTE W/DOPPLER COMPLETE: CPT

## 2019-06-13 PROCEDURE — 86901 BLOOD TYPING SEROLOGIC RH(D): CPT

## 2019-06-13 PROCEDURE — 36415 COLL VENOUS BLD VENIPUNCTURE: CPT

## 2019-06-13 PROCEDURE — 85610 PROTHROMBIN TIME: CPT

## 2019-06-13 PROCEDURE — 81025 URINE PREGNANCY TEST: CPT

## 2019-06-13 PROCEDURE — C1889: CPT

## 2019-06-13 PROCEDURE — C9399: CPT

## 2019-06-13 PROCEDURE — 80053 COMPREHEN METABOLIC PANEL: CPT

## 2019-06-13 PROCEDURE — 93005 ELECTROCARDIOGRAM TRACING: CPT

## 2019-06-13 PROCEDURE — 97161 PT EVAL LOW COMPLEX 20 MIN: CPT

## 2019-06-13 PROCEDURE — 76705 ECHO EXAM OF ABDOMEN: CPT

## 2019-06-13 PROCEDURE — 88304 TISSUE EXAM BY PATHOLOGIST: CPT

## 2019-06-13 PROCEDURE — 80048 BASIC METABOLIC PNL TOTAL CA: CPT

## 2019-06-13 PROCEDURE — 85730 THROMBOPLASTIN TIME PARTIAL: CPT

## 2019-06-13 PROCEDURE — 74177 CT ABD & PELVIS W/CONTRAST: CPT

## 2019-06-13 PROCEDURE — 71045 X-RAY EXAM CHEST 1 VIEW: CPT

## 2019-06-13 PROCEDURE — 83735 ASSAY OF MAGNESIUM: CPT

## 2019-06-13 RX ORDER — MAGNESIUM SULFATE 500 MG/ML
1 VIAL (ML) INJECTION ONCE
Refills: 0 | Status: COMPLETED | OUTPATIENT
Start: 2019-06-13 | End: 2019-06-13

## 2019-06-13 RX ORDER — POTASSIUM CHLORIDE 20 MEQ
40 PACKET (EA) ORAL ONCE
Refills: 0 | Status: COMPLETED | OUTPATIENT
Start: 2019-06-13 | End: 2019-06-13

## 2019-06-13 RX ORDER — SODIUM,POTASSIUM PHOSPHATES 278-250MG
1 POWDER IN PACKET (EA) ORAL ONCE
Refills: 0 | Status: COMPLETED | OUTPATIENT
Start: 2019-06-13 | End: 2019-06-13

## 2019-06-13 RX ORDER — POTASSIUM PHOSPHATE, MONOBASIC POTASSIUM PHOSPHATE, DIBASIC 236; 224 MG/ML; MG/ML
15 INJECTION, SOLUTION INTRAVENOUS ONCE
Refills: 0 | Status: DISCONTINUED | OUTPATIENT
Start: 2019-06-13 | End: 2019-06-13

## 2019-06-13 RX ADMIN — CEFTRIAXONE 100 GRAM(S): 500 INJECTION, POWDER, FOR SOLUTION INTRAMUSCULAR; INTRAVENOUS at 00:00

## 2019-06-13 RX ADMIN — HEPARIN SODIUM 5000 UNIT(S): 5000 INJECTION INTRAVENOUS; SUBCUTANEOUS at 05:14

## 2019-06-13 RX ADMIN — Medication 100 MILLIGRAM(S): at 05:15

## 2019-06-13 RX ADMIN — Medication 1 PACKET(S): at 10:52

## 2019-06-13 RX ADMIN — Medication 10 MILLIGRAM(S): at 05:14

## 2019-06-13 RX ADMIN — Medication 10 MILLIGRAM(S): at 06:00

## 2019-06-13 RX ADMIN — Medication 10 MILLIGRAM(S): at 12:12

## 2019-06-13 RX ADMIN — Medication 10 MILLIGRAM(S): at 13:00

## 2019-06-13 RX ADMIN — Medication 650 MILLIGRAM(S): at 12:12

## 2019-06-13 RX ADMIN — Medication 40 MILLIEQUIVALENT(S): at 08:59

## 2019-06-13 RX ADMIN — Medication 650 MILLIGRAM(S): at 06:00

## 2019-06-13 RX ADMIN — Medication 650 MILLIGRAM(S): at 05:14

## 2019-06-13 RX ADMIN — Medication 650 MILLIGRAM(S): at 13:00

## 2019-06-13 NOTE — DISCHARGE NOTE PROVIDER - CARE PROVIDER_API CALL
Rosalinda Iniguez)  Surgery  Acute Care  [address] [place] [address]  Phone: [phone]  Fax: [phone]  Follow Up Time: Rosalinda Iniguez)  Surgery  Acute Care  [address] [address]  Phone: [phone]  Fax: [phone]  Follow Up Time:     Chris Hillman)  Internal Medicine  [address] [id number]  Phone: [phone]  Fax: [phone]  Follow Up Time:

## 2019-06-13 NOTE — DISCHARGE NOTE NURSING/CASE MANAGEMENT/SOCIAL WORK - NSDCPNDISPN_GEN_ALL_CORE
Activities of daily living, including home environment that might     exacerbate pain or reduce effectiveness of the pain management plan of care as well as strategies to address these issues/Safe use, storage and disposal of opioids when prescribed/Side effects of pain management treatment/Education provided on the pain management plan of care

## 2019-06-13 NOTE — PROGRESS NOTE ADULT - REASON FOR ADMISSION
Acute cholecystitis

## 2019-06-13 NOTE — DISCHARGE NOTE PROVIDER - PROVIDER TOKENS
PROVIDER:[TOKEN:[[id number]]] PROVIDER:[TOKEN:[[id number]]],PROVIDER:[TOKEN:[[id number]]]

## 2019-06-13 NOTE — DISCHARGE NOTE NURSING/CASE MANAGEMENT/SOCIAL WORK - NSDCPNINST_GEN_ALL_CORE
no heavy lifting. Call Dr. Iniguez if you have any questions or concerns. Educational material given- [employer] Patient Education: Surgical Wound Discharge Instructions. Call Dr. Iniguez if you have any questions or concerns. Educational material given- [employer] Online Patient Education: Surgical Wound Discharge Instructions.

## 2019-06-13 NOTE — DISCHARGE NOTE PROVIDER - HOSPITAL COURSE
64 yo F w/ hx of Lupus (not on medication) associated w/ frequent migraines, and vulvar cancer s/p partial vulvectomy (2018), presenting 6/9 w/ RUQ abdominal pain w/ US demonstrating +PCF, GB wall thickening consistent w/ acute cholecystitis now s/p laparoscopic cholecystectomy (6/10). Patient's post-operative course was uncomplicated. Diet was advanced as tolerated and pain was well controlled on medication. On day of discharge, pt deemed stable and ready to return home with plan to follow up as an outpatient.

## 2019-06-13 NOTE — DISCHARGE NOTE PROVIDER - NSDCCPCAREPLAN_GEN_ALL_CORE_FT
PRINCIPAL DISCHARGE DIAGNOSIS  Diagnosis: Acute cholecystitis  Assessment and Plan of Treatment:       SECONDARY DISCHARGE DIAGNOSES  Diagnosis: Gallstones  Assessment and Plan of Treatment:

## 2019-06-13 NOTE — PROGRESS NOTE ADULT - ASSESSMENT
62 yo F w/ hx of Lupus (not on medication) associated w/ frequent migraines, and vulvar cancer s/p partial vulvectomy (2018), presenting 6/9 w/ RUQ abdominal pain w/ US demonstrating +PCF, GB wall thickening consistent w/ acute cholecystitis now s/p laparoscopic cholecystectomy (6/10)    - pain / nausea control (codeine, percocet sensitivity  nausea)  - satting well RA  - CLD/ IVF  - voids  - IV ceftriaxone / Flagyl  - SQH / SCDs  - f/u AM labs
62 yo F w/ hx of Lupus (not on medication) associated w/ frequent migraines, and vulvar cancer s/p partial vulvectomy (2018), presenting 6/9 w/ RUQ abdominal pain w/ US demonstrating +PCF, GB wall thickening consistent w/ acute cholecystitis now s/p laparoscopic cholecystectomy (6/10)    - pain / nausea control (codeine, percocet sensitivity  nausea)  - satting well RA  - CLD/ IVF  - voids  - IV ceftriaxone / Flagyl  - SQH / SCDs  - f/u AM labs
64 yo F w/ hx of Lupus (not on medication) associated w/ frequent migraines, and vulvar cancer s/p partial vulvectomy (2018), presenting 6/9 w/ RUQ abdominal pain w/ US demonstrating +PCF, GB wall thickening consistent w/ acute cholecystitis    - add on for OR tomorrow - laparoscopic cholecystectomy w/ Dr. Iniguez (class 4)  - pain / nausea control (codeine, percocet sensitivity  nausea)  - satting well RA  - NPO (w/ meds) / IVF  - voids  - IV ceftriaxone / Flagyl  - SQH / SCDs  - f/u AM labs
64 yo F w/ hx of Lupus (not on medication) associated w/ frequent migraines, and vulvar cancer s/p partial vulvectomy (2018), presenting 6/9 w/ RUQ abdominal pain w/ US demonstrating +PCF, GB wall thickening consistent w/ acute cholecystitis now s/p laparoscopic cholecystectomy (6/10)    - pain / nausea control (codeine, percocet sensitivity  nausea)  - satting well RA  - CLD  - IV ceftriaxone / Flagyl  - SQH / SCDs  - f/u AM labs

## 2019-06-13 NOTE — DISCHARGE NOTE NURSING/CASE MANAGEMENT/SOCIAL WORK - NSDCDPATPORTLINK_GEN_ALL_CORE
You can access the [facility] Portal, offered by [facility], by registering with the following website: [email or web address]

## 2019-06-13 NOTE — DISCHARGE NOTE PROVIDER - CARE PROVIDERS DIRECT ADDRESSES
,DirectAddress_Unknown ,DirectAddress_Unknown,[email or web address]

## 2019-06-13 NOTE — PROGRESS NOTE ADULT - SUBJECTIVE AND OBJECTIVE BOX
SUBJ:  - no events overnight   - on tele had 2 episodes of PACs for 4-5 beats     MEDICATIONS  (STANDING):  cefTRIAXone   IVPB 2 Gram(s) IV Intermittent every 24 hours  heparin  Injectable 5000 Unit(s) SubCutaneous every 8 hours  lactated ringers. 1000 milliLiter(s) (100 mL/Hr) IV Continuous <Continuous>  metroNIDAZOLE  IVPB 500 milliGRAM(s) IV Intermittent every 8 hours    MEDICATIONS  (PRN):  HYDROmorphone  Injectable 1 milliGRAM(s) IV Push every 4 hours PRN Severe Pain (7 - 10)  ondansetron Injectable 4 milliGRAM(s) IV Push every 6 hours PRN Nausea            Vital Signs Last 24 Hrs  T(C): 36.7 (11 Jun 2019 09:22), Max: 36.9 (10 James 2019 14:31)  T(F): 98 (11 Jun 2019 09:22), Max: 98.4 (10 James 2019 14:31)  HR: 92 (11 Jun 2019 12:26) (60 - 104)  BP: 117/56 (11 Jun 2019 12:26) (81/58 - 148/74)  BP(mean): 81 (11 Jun 2019 12:26) (65 - 116)  RR: 17 (11 Jun 2019 12:26) (10 - 24)  SpO2: 92% (11 Jun 2019 12:26) (92% - 98%)  PHYSICAL EXAM:  · CONSTITUTIONAL:	Well-developed, well nourished    BMI-  ·RESPIRATORY:   airway patent; breath sounds equal; good air movement; respirations non-labored; clear to auscultation bilaterally; no chest wall tenderness; no intercostal retractions; no rales,rhonchi or wheeze  · CARDIOVASCULAR	regular rate and rhythm  no rub  no murmur  normal PMI    TELEMETRY: APCs x 2 episodes at 1 am     ECG: sinus rhythm with APCs     TTE: normal ECHO BUT ELEVATED PASP 50 MMHG     LABS:                        11.0   9.67  )-----------( 282      ( 11 Jun 2019 09:21 )             35.4     06-11    135  |  100  |  18  ----------------------------<  136<H>  4.0   |  24  |  0.68    Ca    8.5      11 Jun 2019 09:21  Phos  3.2     06-11  Mg     2.0     06-11    TPro  6.3  /  Alb  2.9<L>  /  TBili  0.4  /  DBili  x   /  AST  233<H>  /  ALT  324<H>  /  AlkPhos  179<H>  06-11    CARDIAC MARKERS ( 09 Jun 2019 14:46 )  x     / <0.01 ng/mL / 40 U/L / x     / <1.0 ng/mL      PT/INR - ( 10 James 2019 07:22 )   PT: 12.3 sec;   INR: 1.09          PTT - ( 10 James 2019 07:22 )  PTT:33.5 sec  Creatinine Trend: 0.68<--, 0.59<--, 0.79<--  I&O's Summary    10 James 2019 07:01  -  11 Jun 2019 07:00  --------------------------------------------------------  IN: 1850 mL / OUT: 530 mL / NET: 1320 mL    11 Jun 2019 07:01  -  11 Jun 2019 12:51  --------------------------------------------------------  IN: 240 mL / OUT: 0 mL / NET: 240 mL    IMPRESSION AND PLAN:    64 y/o F w hx of lupus/ vulvar cancer and no other cardiac history presented today for a lap choley for cholecystitis found to be in sinus tachycardia per primary anesthesiologist.     Sinus tachycardia   Etiology likely 2/2 to anxiety vs sepsis (cholangitis) vs pain - NOW RESOLVED.   ECHO with normal EF but PASP 50 mmHg - this will need to be re-evaluated by a cardiologist/ outpatient. Patient can make an apt to see Dr. Hillman by calling [phone].  Will sign off - please reconsult with any further questions     Emerald Fonseca MD   Cardiology fellow
STATUS POST:  laparoscopic cholecystectomy      SUBJECTIVE: Patient seen and examined bedside by chief resident.  denies flatus or BM. pain controlled. tolerating CLD     heparin  Injectable 5000 Unit(s) SubCutaneous every 8 hours  metroNIDAZOLE  IVPB 500 milliGRAM(s) IV Intermittent every 8 hours      Vital Signs Last 24 Hrs  T(C): 36.4 (2019 05:09), Max: 36.9 (10 James 2019 14:31)  T(F): 97.5 (2019 05:09), Max: 98.4 (10 James 2019 14:31)  HR: 78 (2019 04:50) (60 - 104)  BP: 131/63 (2019 04:50) (81/58 - 156/82)  BP(mean): 90 (2019 04:50) (65 - 116)  RR: 16 (2019 04:50) (10 - 24)  SpO2: 95% (2019 04:50) (93% - 98%)  I&O's Detail    10 James 2019 07:01  -  2019 07:00  --------------------------------------------------------  IN:    IV PiggyBack: 350 mL    lactated ringers.: 1500 mL  Total IN: 1850 mL    OUT:    Indwelling Catheter - Urethral: 30 mL    Voided: 350 mL    Voided: 150 mL  Total OUT: 530 mL    Total NET: 1320 mL          General: NAD, resting comfortably in bed  C/V: NSR  Pulm: Nonlabored breathing, no respiratory distress  Abd: soft, mild tenderness around incisions, nondistended, incision is clean, dry and intact  Extrem: WWP, no edema, SCDs in place        LABS:                        13.7   8.66  )-----------( 312      ( 10 James 2019 07:22 )             43.2     06-10    137  |  100  |  10  ----------------------------<  131<H>  4.0   |  25  |  0.59    Ca    8.8      10 James 2019 07:22  Phos  3.0     06-10  Mg     1.9     06-10    TPro  7.5  /  Alb  3.9  /  TBili  1.2  /  DBili  x   /  AST  289<H>  /  ALT  227<H>  /  AlkPhos  154<H>  06-10    PT/INR - ( 10 James 2019 07:22 )   PT: 12.3 sec;   INR: 1.09          PTT - ( 10 James 2019 07:22 )  PTT:33.5 sec  Urinalysis Basic - ( 2019 15:11 )    Color: Yellow / Appearance: Clear / S.020 / pH: x  Gluc: x / Ketone: Trace mg/dL  / Bili: Negative / Urobili: 0.2 E.U./dL   Blood: x / Protein: 30 mg/dL / Nitrite: NEGATIVE   Leuk Esterase: Small / RBC: > 10 /HPF / WBC 5-10 /HPF   Sq Epi: x / Non Sq Epi: 5-10 /HPF / Bacteria: Present /HPF        RADIOLOGY & ADDITIONAL STUDIES:
STATUS POST:  laparoscopic cholecystectomy      SUBJECTIVE: Patient seen and examined bedside by chief resident.  pt complains of dizziness when she gets up fast. pain controlled. some nausea when she drinks     cefTRIAXone   IVPB 2 Gram(s) IV Intermittent every 24 hours  heparin  Injectable 5000 Unit(s) SubCutaneous every 8 hours  metroNIDAZOLE  IVPB 500 milliGRAM(s) IV Intermittent every 8 hours      Vital Signs Last 24 Hrs  T(C): 36.3 (12 Jun 2019 05:28), Max: 37 (11 Jun 2019 16:30)  T(F): 97.4 (12 Jun 2019 05:28), Max: 98.6 (11 Jun 2019 16:30)  HR: 79 (12 Jun 2019 05:28) (68 - 92)  BP: 118/72 (12 Jun 2019 05:28) (108/63 - 119/75)  BP(mean): 81 (11 Jun 2019 12:26) (80 - 81)  RR: 17 (12 Jun 2019 05:28) (17 - 18)  SpO2: 95% (12 Jun 2019 05:28) (92% - 95%)  I&O's Detail    11 Jun 2019 07:01  -  12 Jun 2019 07:00  --------------------------------------------------------  IN:    IV PiggyBack: 250 mL    lactated ringers.: 1950 mL    Oral Fluid: 240 mL  Total IN: 2440 mL    OUT:    Voided: 800 mL  Total OUT: 800 mL    Total NET: 1640 mL          General: NAD, resting comfortably in bed  C/V: NSR  Pulm: Nonlabored breathing, no respiratory distress  Abd: soft, some tenderness around incisions, incision is clean, dry and intact   Extrem: WWP, no edema, SCDs in place        LABS:                        10.3   6.75  )-----------( 273      ( 12 Jun 2019 06:33 )             33.0     06-11    135  |  100  |  18  ----------------------------<  136<H>  4.0   |  24  |  0.68    Ca    8.5      11 Jun 2019 09:21  Phos  3.2     06-11  Mg     2.0     06-11    TPro  6.3  /  Alb  2.9<L>  /  TBili  0.4  /  DBili  x   /  AST  233<H>  /  ALT  324<H>  /  AlkPhos  179<H>  06-11          RADIOLOGY & ADDITIONAL STUDIES:
STATUS POST:  laparoscopic cholecystectomy     SUBJECTIVE: Pt seen and examined post op. pt complains of feeling very tired. pain controlled. denies chest pain, sob    Vital Signs Last 24 Hrs  T(C): 36.9 (10 James 2019 14:31), Max: 36.9 (10 James 2019 14:31)  T(F): 98.4 (10 James 2019 14:31), Max: 98.4 (10 James 2019 14:31)  HR: 80 (10 James 2019 15:51) (80 - 104)  BP: 111/58 (10 James 2019 15:51) (107/59 - 160/74)  BP(mean): 87 (10 James 2019 15:51) (80 - 101)  RR: 12 (10 James 2019 15:51) (11 - 20)  SpO2: 94% (10 James 2019 15:) (93% - 97%)  I&O's Summary    2019 07:  -  10 James 2019 07:00  --------------------------------------------------------  IN: 0 mL / OUT: 450 mL / NET: -450 mL    10 James 2019 07:  -  10 James 2019 16:21  --------------------------------------------------------  IN: 200 mL / OUT: 0 mL / NET: 200 mL      I&O's Detail    2019 07:  -  10 James 2019 07:00  --------------------------------------------------------  IN:  Total IN: 0 mL    OUT:    Voided: 450 mL  Total OUT: 450 mL    Total NET: -450 mL      10 James 2019 07:  -  10 James 2019 16:21  --------------------------------------------------------  IN:    lactated ringers.: 200 mL  Total IN: 200 mL    OUT:  Total OUT: 0 mL    Total NET: 200 mL            LABS:                        13.7   8.66  )-----------( 312      ( 10 James 2019 07:22 )             43.2     06-10    137  |  100  |  10  ----------------------------<  131<H>  4.0   |  25  |  0.59    Ca    8.8      10 James 2019 07:22  Phos  3.0     06-10  Mg     1.9     06-10    TPro  7.5  /  Alb  3.9  /  TBili  1.2  /  DBili  x   /  AST  289<H>  /  ALT  227<H>  /  AlkPhos  154<H>  06-10    PT/INR - ( 10 James 2019 07:22 )   PT: 12.3 sec;   INR: 1.09          PTT - ( 10 James 2019 07:22 )  PTT:33.5 sec  Urinalysis Basic - ( 2019 15:11 )    Color: Yellow / Appearance: Clear / S.020 / pH: x  Gluc: x / Ketone: Trace mg/dL  / Bili: Negative / Urobili: 0.2 E.U./dL   Blood: x / Protein: 30 mg/dL / Nitrite: NEGATIVE   Leuk Esterase: Small / RBC: > 10 /HPF / WBC 5-10 /HPF   Sq Epi: x / Non Sq Epi: 5-10 /HPF / Bacteria: Present /HPF        Physical exam :  Neuro: NAD  Respiratory: CTA b/l. no respiratory distress  Cardiovascular: NSR, RRR  Gastrointestinal: soft, NT/ND, incision is clean, dry and intact  Extremities: (-) edema b/l      A/P: 63y Female   - Diet: CLD  - Activity: OOB as tolerated   - Labs: AM labs   - Pain medication  - DVT ppx  - Antibiotic  - cardiology consult
SUBJECTIVE: Patient seen and examined bedside by chief resident.  patient still having alot of RUQ pain. ready for OR today        Vital Signs Last 24 Hrs  T(C): 35.8 (10 James 2019 08:45), Max: 36.8 (2019 14:21)  T(F): 96.5 (10 James 2019 08:45), Max: 98.3 (2019 22:44)  HR: 90 (10 James 2019 08:45) (76 - 106)  BP: 156/82 (10 James 2019 08:45) (148/77 - 160/74)  BP(mean): --  RR: 17 (10 James 2019 08:45) (16 - 18)  SpO2: 96% (10 James 2019 08:45) (95% - 97%)  I&O's Detail    2019 07:01  -  10 James 2019 07:00  --------------------------------------------------------  IN:  Total IN: 0 mL    OUT:    Voided: 450 mL  Total OUT: 450 mL    Total NET: -450 mL          General: NAD, resting comfortably in bed  C/V: NSR  Pulm: Nonlabored breathing, no respiratory distress  Abd: soft, RUQ tender to palpation  Extrem: WWP, no edema, SCDs in place        LABS:                        13.7   8.66  )-----------( 312      ( 10 James 2019 07:22 )             43.2     06-10    137  |  100  |  10  ----------------------------<  131<H>  4.0   |  25  |  0.59    Ca    8.8      10 James 2019 07:22  Phos  3.0     06-10  Mg     1.9     06-10    TPro  7.5  /  Alb  3.9  /  TBili  1.2  /  DBili  x   /  AST  289<H>  /  ALT  227<H>  /  AlkPhos  154<H>  06-10    PT/INR - ( 10 James 2019 07:22 )   PT: 12.3 sec;   INR: 1.09          PTT - ( 10 James 2019 07:22 )  PTT:33.5 sec  Urinalysis Basic - ( 2019 15:11 )    Color: Yellow / Appearance: Clear / S.020 / pH: x  Gluc: x / Ketone: Trace mg/dL  / Bili: Negative / Urobili: 0.2 E.U./dL   Blood: x / Protein: 30 mg/dL / Nitrite: NEGATIVE   Leuk Esterase: Small / RBC: > 10 /HPF / WBC 5-10 /HPF   Sq Epi: x / Non Sq Epi: 5-10 /HPF / Bacteria: Present /HPF        RADIOLOGY & ADDITIONAL STUDIES:
SUBJECTIVE: Feeling well, tolerating diet no N/V.  Patient seen and examined bedside by chief resident.    cefTRIAXone   IVPB 2 Gram(s) IV Intermittent every 24 hours  heparin  Injectable 5000 Unit(s) SubCutaneous every 8 hours  metroNIDAZOLE  IVPB 500 milliGRAM(s) IV Intermittent every 8 hours      Vital Signs Last 24 Hrs  T(C): 36.5 (13 Jun 2019 06:01), Max: 36.8 (12 Jun 2019 20:20)  T(F): 97.7 (13 Jun 2019 06:01), Max: 98.3 (12 Jun 2019 20:20)  HR: 64 (13 Jun 2019 06:01) (64 - 77)  BP: 125/79 (13 Jun 2019 06:01) (112/67 - 127/75)  BP(mean): --  RR: 16 (13 Jun 2019 06:01) (16 - 17)  SpO2: 94% (13 Jun 2019 06:01) (94% - 96%)  I&O's Detail    12 Jun 2019 07:01  -  13 Jun 2019 07:00  --------------------------------------------------------  IN:    lactated ringers.: 600 mL    Oral Fluid: 900 mL  Total IN: 1500 mL    OUT:    Voided: 2050 mL  Total OUT: 2050 mL    Total NET: -550 mL          General: NAD, resting comfortably in bed  C/V: NSR  Pulm: Nonlabored breathing, no respiratory distress  Abd: soft, NT/ND, incisions CDI      LABS:                        10.9   6.98  )-----------( 310      ( 13 Jun 2019 06:24 )             34.0     06-13    135  |  101  |  6<L>  ----------------------------<  92  3.3<L>   |  27  |  0.59    Ca    8.5      13 Jun 2019 06:24  Phos  2.8     06-13  Mg     1.9     06-13    TPro  5.9<L>  /  Alb  3.0<L>  /  TBili  0.2  /  DBili  x   /  AST  78<H>  /  ALT  204<H>  /  AlkPhos  124<H>  06-12

## 2019-06-13 NOTE — DISCHARGE NOTE PROVIDER - NSDCFUADDINST_GEN_ALL_CORE_FT
-For pain you may take tylenol and/or NSAIDs (such as motrin/advil) as labeled, as needed.  -No heavy lifting >20 pounds or strenuous exercise.   -You may shower but NO baths and NO swimming. Keep your incisions clean & dry. Avoid a direct stream of water over incision sites. Do not scrub. Pat dry when done.  -Contact your doctor or go to the ER for fever > 101.5, chills, nausea, vomiting, chest pain, shortness of breath, pain not controlled by medication or excessive bleeding.  -Please follow up with Dr. Iniguez in 1-2 weeks; you may call the office to make an appointment at your earliest convenience. -For pain you may take tylenol and/or NSAIDs (such as motrin/advil) as labeled, as needed.  -No heavy lifting >20 pounds or strenuous exercise.   -You may shower but NO baths and NO swimming. Keep your incisions clean & dry. Avoid a direct stream of water over incision sites. Do not scrub. Pat dry when done.  -Contact your doctor or go to the ER for fever > 101.5, chills, nausea, vomiting, chest pain, shortness of breath, pain not controlled by medication or excessive bleeding.  -Please follow up with Dr. Iniguez in 1-2 weeks; you may call the office to make an appointment at your earliest convenience.  -Please follow up with Dr. Hillman (Cardiology); you may call the office to make an appointment at your earliest convenience.

## 2019-06-14 LAB
CULTURE RESULTS: SIGNIFICANT CHANGE UP
CULTURE RESULTS: SIGNIFICANT CHANGE UP
SPECIMEN SOURCE: SIGNIFICANT CHANGE UP
SPECIMEN SOURCE: SIGNIFICANT CHANGE UP
SURGICAL PATHOLOGY STUDY: SIGNIFICANT CHANGE UP

## 2019-06-17 DIAGNOSIS — K80.00 CALCULUS OF GALLBLADDER WITH ACUTE CHOLECYSTITIS WITHOUT OBSTRUCTION: ICD-10-CM

## 2019-06-17 DIAGNOSIS — K82.A1 GANGRENE OF GALLBLADDER IN CHOLECYSTITIS: ICD-10-CM

## 2019-06-17 DIAGNOSIS — M32.9 SYSTEMIC LUPUS ERYTHEMATOSUS, UNSPECIFIED: ICD-10-CM

## 2019-06-17 DIAGNOSIS — R00.0 TACHYCARDIA, UNSPECIFIED: ICD-10-CM

## 2019-06-17 DIAGNOSIS — C51.9 MALIGNANT NEOPLASM OF VULVA, UNSPECIFIED: ICD-10-CM

## 2019-06-24 ENCOUNTER — APPOINTMENT (OUTPATIENT)
Dept: SURGERY | Facility: CLINIC | Age: 64
End: 2019-06-24

## 2019-07-01 ENCOUNTER — APPOINTMENT (OUTPATIENT)
Dept: SURGERY | Facility: CLINIC | Age: 64
End: 2019-07-01

## 2019-07-01 VITALS
BODY MASS INDEX: 37.68 KG/M2 | SYSTOLIC BLOOD PRESSURE: 133 MMHG | HEART RATE: 77 BPM | OXYGEN SATURATION: 98 % | WEIGHT: 167.5 LBS | DIASTOLIC BLOOD PRESSURE: 85 MMHG | TEMPERATURE: 97.9 F | HEIGHT: 56 IN

## 2019-07-01 DIAGNOSIS — Z90.49 ACQUIRED ABSENCE OF OTHER SPECIFIED PARTS OF DIGESTIVE TRACT: ICD-10-CM

## 2019-07-09 PROBLEM — Z90.49 S/P LAPAROSCOPIC CHOLECYSTECTOMY: Status: ACTIVE | Noted: 2019-07-09

## 2019-08-21 ENCOUNTER — APPOINTMENT (OUTPATIENT)
Dept: GYNECOLOGIC ONCOLOGY | Facility: CLINIC | Age: 64
End: 2019-08-21
Payer: COMMERCIAL

## 2019-08-23 ENCOUNTER — APPOINTMENT (OUTPATIENT)
Dept: GYNECOLOGIC ONCOLOGY | Facility: CLINIC | Age: 64
End: 2019-08-23
Payer: COMMERCIAL

## 2019-09-04 NOTE — PATIENT PROFILE ADULT - NSPROMEDSADMININFO_GEN_A_NUR
-- Message is from the [facility]--    Reason for Call: Patient is returning the call she missed earlier today in regards to the appointment. Please get back to her. Thank you .    Caller Information       Type Contact Phone    09/04/2019 10:53 AM Phone (Incoming) Alvaro Cutler (Self) [phone] (W)          Alternative phone number: None    Turnaround time given to caller:   \"This message will be sent to [state Provider's name]. The clinical team will fulfill your request as soon as they review your message when the office opens tomorrow.\"     no concerns

## 2019-09-06 ENCOUNTER — APPOINTMENT (OUTPATIENT)
Dept: GYNECOLOGIC ONCOLOGY | Facility: CLINIC | Age: 64
End: 2019-09-06
Payer: COMMERCIAL

## 2019-09-20 ENCOUNTER — APPOINTMENT (OUTPATIENT)
Dept: GYNECOLOGIC ONCOLOGY | Facility: CLINIC | Age: 64
End: 2019-09-20
Payer: COMMERCIAL

## 2019-09-20 VITALS
BODY MASS INDEX: 38.69 KG/M2 | SYSTOLIC BLOOD PRESSURE: 124 MMHG | HEIGHT: 56 IN | WEIGHT: 172 LBS | DIASTOLIC BLOOD PRESSURE: 78 MMHG

## 2019-09-20 PROCEDURE — 99215 OFFICE O/P EST HI 40 MIN: CPT

## 2019-09-20 NOTE — ASSESSMENT
[FreeTextEntry1] : Patient doing well. Primary site with ROZ. Will request PET for better evaluation of inguinal lymphadenopathy.\par \par [] PET CT\par [] Patient will follow-up in 3 months \par \par

## 2019-09-20 NOTE — PHYSICAL EXAM
[de-identified] : right sided tenderness and lymphadenopathy, left groin wnl [de-identified] : Incisions healed [de-identified] : mucosa now well healed, ROZ [de-identified] : Skin dermatitis now resolved. Vulvar incision now 80% healed. Clean granulation tissue.

## 2019-11-29 ENCOUNTER — OUTPATIENT (OUTPATIENT)
Dept: OUTPATIENT SERVICES | Facility: HOSPITAL | Age: 64
LOS: 1 days | End: 2019-11-29
Payer: COMMERCIAL

## 2019-11-29 ENCOUNTER — APPOINTMENT (OUTPATIENT)
Dept: CT IMAGING | Facility: HOSPITAL | Age: 64
End: 2019-11-29
Payer: COMMERCIAL

## 2019-11-29 DIAGNOSIS — Z98.891 HISTORY OF UTERINE SCAR FROM PREVIOUS SURGERY: Chronic | ICD-10-CM

## 2019-11-29 PROCEDURE — 74177 CT ABD & PELVIS W/CONTRAST: CPT | Mod: 26

## 2019-11-29 PROCEDURE — 74177 CT ABD & PELVIS W/CONTRAST: CPT

## 2019-12-12 ENCOUNTER — OUTPATIENT (OUTPATIENT)
Dept: OUTPATIENT SERVICES | Facility: HOSPITAL | Age: 64
LOS: 1 days | End: 2019-12-12
Payer: COMMERCIAL

## 2019-12-12 DIAGNOSIS — Z98.891 HISTORY OF UTERINE SCAR FROM PREVIOUS SURGERY: Chronic | ICD-10-CM

## 2019-12-12 PROCEDURE — 82962 GLUCOSE BLOOD TEST: CPT

## 2019-12-12 PROCEDURE — A9552: CPT

## 2019-12-12 PROCEDURE — 78815 PET IMAGE W/CT SKULL-THIGH: CPT | Mod: 26

## 2019-12-12 PROCEDURE — 78815 PET IMAGE W/CT SKULL-THIGH: CPT

## 2019-12-20 ENCOUNTER — APPOINTMENT (OUTPATIENT)
Dept: GYNECOLOGIC ONCOLOGY | Facility: CLINIC | Age: 64
End: 2019-12-20

## 2019-12-27 ENCOUNTER — APPOINTMENT (OUTPATIENT)
Dept: GYNECOLOGIC ONCOLOGY | Facility: CLINIC | Age: 64
End: 2019-12-27

## 2020-01-15 ENCOUNTER — APPOINTMENT (OUTPATIENT)
Dept: GYNECOLOGIC ONCOLOGY | Facility: CLINIC | Age: 65
End: 2020-01-15
Payer: COMMERCIAL

## 2020-01-15 VITALS
HEIGHT: 56 IN | WEIGHT: 170 LBS | DIASTOLIC BLOOD PRESSURE: 90 MMHG | BODY MASS INDEX: 38.24 KG/M2 | OXYGEN SATURATION: 98 % | SYSTOLIC BLOOD PRESSURE: 142 MMHG | HEART RATE: 76 BPM

## 2020-01-15 PROCEDURE — 99215 OFFICE O/P EST HI 40 MIN: CPT

## 2020-01-15 NOTE — REASON FOR VISIT
[FreeTextEntry1] : Surveillance. Patient presents for her 3 month follow-up. \par \par No acute complaints, but reports persistent lower back pain that radiates down her right and groin. This is similar to the pain she described her last visit. She has been seen by her PMD and pain specialist. They feel her pain is consistent with a herniated disc. Plan is for local injections and possible PT\par \par PET ROZ\par

## 2020-01-15 NOTE — HISTORY OF PRESENT ILLNESS
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 \par \par Previous Therapy\par 1)US 3/05/2018\par    a)Uterus 5.2x4.5x3.7cm\par    b)Endometrial stripe 0.92mm\par    c)Normal b/l ovaries \par 2)Vulvar Mass Bx 3/06/2018\par    a)Poorly differentiated mostly squamous cell carcinoma in situ suspicious for invasion \par 3)CTAP 3/21/2018\par    a)Uterus with fibroid of 0.8cm \par    b)Endometrial and endocervical canals are not distended, no parametrial or perivaginal mass lesions\par    c)Normal retroperitoneal, pelvic sidewall and groin LN\par 4)PET 4/03/2018\par    a)Hypermetabolic anterior vulva \par    b)No lymph node activity identified\par 5)Radical vulvectomy, bilateral sentinel lymph node biopsy with nuclear medicine 4/10/2018\par     a) Moderately diff squamous cell carcinoma of the vulva: FIGO stage II (T2 N0 M0) with >1 cm \par 6)Vulvar Bx 8/15/18\par    a)Benign squamous mucosa with acute granulation tissue negative for carcinoma \par 7) PET 12/12/2019 \par    a)No evidence of abnormal FDG activity to suggest biologic tumor activity, specifically within the bilateral inguinal and right external iliac chain lymph nodes. \par \par \par Health Maintenance\par Mammogram 8/2017

## 2020-01-15 NOTE — ASSESSMENT
[FreeTextEntry1] : Patient doing well. Primary site with ROZ. Groin pain resolved.\par \par Patient now 2 years out. We discussed surveillance schedule. Symptoms of recurrence once again discussed and she is encouraged to follow up sooner if she notices any of these symptoms. \par \par [] Patient will follow-up in 6 months \par \par

## 2020-01-27 LAB — HPV HIGH+LOW RISK DNA PNL CVX: NOT DETECTED

## 2020-08-11 ENCOUNTER — APPOINTMENT (OUTPATIENT)
Dept: GYNECOLOGIC ONCOLOGY | Facility: CLINIC | Age: 65
End: 2020-08-11
Payer: COMMERCIAL

## 2020-08-21 ENCOUNTER — APPOINTMENT (OUTPATIENT)
Dept: GYNECOLOGIC ONCOLOGY | Facility: CLINIC | Age: 65
End: 2020-08-21
Payer: COMMERCIAL

## 2020-08-21 VITALS
SYSTOLIC BLOOD PRESSURE: 135 MMHG | BODY MASS INDEX: 37.34 KG/M2 | TEMPERATURE: 97 F | HEART RATE: 75 BPM | HEIGHT: 56 IN | OXYGEN SATURATION: 97 % | WEIGHT: 166 LBS | DIASTOLIC BLOOD PRESSURE: 84 MMHG

## 2020-08-21 DIAGNOSIS — M54.9 DORSALGIA, UNSPECIFIED: ICD-10-CM

## 2020-08-21 PROCEDURE — 99215 OFFICE O/P EST HI 40 MIN: CPT

## 2020-08-21 NOTE — ASSESSMENT
[FreeTextEntry1] : Patient doing well. Primary site with ROZ, however given persistent back pain I feel it is prudent to obtain imaging to r/o metastatic disease. Will request CT abdomen/pelvis\par \par Patient now 2 years out. We discussed surveillance schedule. Symptoms of recurrence once again discussed and she is encouraged to follow up sooner if she notices any of these symptoms. \par \par [] CT abdomen/pelvis\par [] Patient will follow-up in 6 months \par \par

## 2020-08-21 NOTE — REASON FOR VISIT
[FreeTextEntry1] : Surveillance. \par \par Patient reports that she has been doing overall well. Her only acute complaint is chronic back pain that she describes as lower back pain radiating to her right leg and groin. She had an MRI which demonstrated compressed lumbar discs. She has undergone pain injections and physical therapy with no relief. \par

## 2020-08-21 NOTE — PHYSICAL EXAM
[de-identified] : no lymphadenopathy [de-identified] : ROZ [de-identified] : ROZ, vaginal atrophy noted, narrow apex making it difficult to visualize the cervix [de-identified] : Skin dermatitis now resolved. Vulvar incision now 80% healed. Clean granulation tissue. [de-identified] : unable to visualize

## 2020-08-21 NOTE — HISTORY OF PRESENT ILLNESS
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 \par \par Previous Therapy\par 1)US 3/05/2018\par    a)Uterus 5.2x4.5x3.7cm\par    b)Endometrial stripe 0.92mm\par    c)Normal b/l ovaries \par 2)Vulvar Mass Bx 3/06/2018\par    a)Poorly differentiated mostly squamous cell carcinoma in situ suspicious for invasion \par 3)CTAP 3/21/2018\par    a)Uterus with fibroid of 0.8cm \par    b)Endometrial and endocervical canals are not distended, no parametrial or perivaginal mass lesions\par    c)Normal retroperitoneal, pelvic sidewall and groin LN\par 4)PET 4/03/2018\par    a)Hypermetabolic anterior vulva \par    b)No lymph node activity identified\par 5)Radical vulvectomy, bilateral sentinel lymph node biopsy with nuclear medicine 4/10/2018\par     a) Moderately diff squamous cell carcinoma of the vulva: FIGO stage II (T2 N0 M0) with >1 cm \par 6)Vulvar Bx 8/15/18\par    a)Benign squamous mucosa with acute granulation tissue negative for carcinoma \par 7) PET 12/12/2019 \par    a)No evidence of abnormal FDG activity to suggest biologic tumor activity, specifically within the bilateral inguinal and right external iliac chain lymph nodes. \par 8) Cotesting 1/15/20 - Neg, HPV neg\par \par \par Health Maintenance\par Mammogram 8/2017

## 2020-09-05 ENCOUNTER — APPOINTMENT (OUTPATIENT)
Dept: CT IMAGING | Facility: HOSPITAL | Age: 65
End: 2020-09-05
Payer: COMMERCIAL

## 2020-09-12 ENCOUNTER — RESULT REVIEW (OUTPATIENT)
Age: 65
End: 2020-09-12

## 2020-09-12 ENCOUNTER — OUTPATIENT (OUTPATIENT)
Dept: OUTPATIENT SERVICES | Facility: HOSPITAL | Age: 65
LOS: 1 days | End: 2020-09-12
Payer: COMMERCIAL

## 2020-09-12 DIAGNOSIS — Z98.891 HISTORY OF UTERINE SCAR FROM PREVIOUS SURGERY: Chronic | ICD-10-CM

## 2020-09-12 PROCEDURE — 74177 CT ABD & PELVIS W/CONTRAST: CPT | Mod: 26

## 2020-09-12 PROCEDURE — 74177 CT ABD & PELVIS W/CONTRAST: CPT

## 2021-05-04 ENCOUNTER — NON-APPOINTMENT (OUTPATIENT)
Age: 66
End: 2021-05-04

## 2021-05-11 ENCOUNTER — APPOINTMENT (OUTPATIENT)
Dept: GYNECOLOGIC ONCOLOGY | Facility: CLINIC | Age: 66
End: 2021-05-11
Payer: COMMERCIAL

## 2021-05-11 VITALS
DIASTOLIC BLOOD PRESSURE: 75 MMHG | BODY MASS INDEX: 36.67 KG/M2 | OXYGEN SATURATION: 98 % | HEART RATE: 85 BPM | SYSTOLIC BLOOD PRESSURE: 149 MMHG | HEIGHT: 56 IN | TEMPERATURE: 97.5 F | WEIGHT: 163 LBS

## 2021-05-11 PROCEDURE — 99072 ADDL SUPL MATRL&STAF TM PHE: CPT

## 2021-05-11 PROCEDURE — 99215 OFFICE O/P EST HI 40 MIN: CPT

## 2021-05-11 NOTE — PHYSICAL EXAM
[Normal] : Anus and perineum: Normal sphincter tone, no masses, no prolapse. [de-identified] : no lymphadenopathy [de-identified] : ROZ [de-identified] : ROZ, vaginal atrophy noted, narrow apex making it difficult to visualize the cervix [de-identified] : unable to visualize [de-identified] : Skin dermatitis now resolved. Vulvar incision now 80% healed. Clean granulation tissue.

## 2021-05-11 NOTE — REASON FOR VISIT
[FreeTextEntry1] : 6 month surveillance visit.\par \par Patient shared with me that she had a right hip replacement in February. 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 She reports that she is doing well and regaining function.\par \par She has no acute gyn complaints.\par

## 2021-05-11 NOTE — HISTORY OF PRESENT ILLNESS
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 \par \par Previous Therapy\par 1)US 3/05/2018\par    a)Uterus 5.2x4.5x3.7cm\par    b)Endometrial stripe 0.92mm\par    c)Normal b/l ovaries \par 2)Vulvar Mass Bx 3/06/2018\par    a)Poorly differentiated mostly squamous cell carcinoma in situ suspicious for invasion \par 3)CTAP 3/21/2018\par    a)Uterus with fibroid of 0.8cm \par    b)Endometrial and endocervical canals are not distended, no parametrial or perivaginal mass lesions\par    c)Normal retroperitoneal, pelvic sidewall and groin LN\par 4)PET 4/03/2018\par    a)Hypermetabolic anterior vulva \par    b)No lymph node activity identified\par 5)Radical vulvectomy, bilateral sentinel lymph node biopsy with nuclear medicine 4/10/2018\par     a) Moderately diff squamous cell carcinoma of the vulva: FIGO stage II (T2 N0 M0) with >1 cm \par 6)Vulvar Bx 8/15/18\par    a)Benign squamous mucosa with acute granulation tissue negative for carcinoma \par 7) PET 12/12/2019 \par    a)No evidence of abnormal FDG activity to suggest biologic tumor activity, specifically within the bilateral inguinal and right external iliac chain lymph nodes. \par 8) Cotesting 1/15/20 - Neg, HPV neg\par 9) CT A/P - ROZ\par \par \par Health Maintenance\par Mammogram 8/2017

## 2021-11-16 ENCOUNTER — APPOINTMENT (OUTPATIENT)
Dept: GYNECOLOGIC ONCOLOGY | Facility: CLINIC | Age: 66
End: 2021-11-16

## 2021-12-17 ENCOUNTER — APPOINTMENT (OUTPATIENT)
Dept: GYNECOLOGIC ONCOLOGY | Facility: CLINIC | Age: 66
End: 2021-12-17
Payer: COMMERCIAL

## 2021-12-17 VITALS
DIASTOLIC BLOOD PRESSURE: 84 MMHG | SYSTOLIC BLOOD PRESSURE: 145 MMHG | HEART RATE: 75 BPM | TEMPERATURE: 97.4 F | OXYGEN SATURATION: 97 % | HEIGHT: 56 IN | WEIGHT: 166 LBS | BODY MASS INDEX: 37.34 KG/M2 | RESPIRATION RATE: 18 BRPM

## 2021-12-17 DIAGNOSIS — E66.9 OBESITY, UNSPECIFIED: ICD-10-CM

## 2021-12-17 PROCEDURE — 99215 OFFICE O/P EST HI 40 MIN: CPT

## 2021-12-20 PROBLEM — E66.9 OBESITY (BMI 30.0-34.9): Status: ACTIVE | Noted: 2021-12-20

## 2021-12-20 NOTE — REASON FOR VISIT
[FreeTextEntry1] : Patient seen today for 6 month surveillance. She has no complaints today. Denies vaginal or pelvic pain, vaginal bleeding, SOB, CP. Not sexually active since diagnosis,  passed away years ago. \par \par She shared with me that she continues to have trouble with weight loss. We discussed approaches to dieting and exercise, and I suggested she speak with a nutritionist.

## 2021-12-20 NOTE — HISTORY OF PRESENT ILLNESS
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 \par \par Previous Therapy\par 1)US 3/05/2018\par    a)Uterus 5.2x4.5x3.7cm\par    b)Endometrial stripe 0.92mm\par    c)Normal b/l ovaries \par 2)Vulvar Mass Bx 3/06/2018\par    a)Poorly differentiated mostly squamous cell carcinoma in situ suspicious for invasion \par 3)CTAP 3/21/2018\par    a)Uterus with fibroid of 0.8cm \par    b)Endometrial and endocervical canals are not distended, no parametrial or perivaginal mass lesions\par    c)Normal retroperitoneal, pelvic sidewall and groin LN\par 4)PET 4/03/2018\par    a)Hypermetabolic anterior vulva \par    b)No lymph node activity identified\par 5)Radical vulvectomy, bilateral sentinel lymph node biopsy with nuclear medicine 4/10/2018\par     a) Moderately diff squamous cell carcinoma of the vulva: FIGO stage II (T2 N0 M0) with >1 cm \par 6)Vulvar Bx 8/15/18\par    a)Benign squamous mucosa with acute granulation tissue negative for carcinoma \par 7) PET 12/12/2019 \par    a)No evidence of abnormal FDG activity to suggest biologic tumor activity, specifically within the bilateral inguinal and right external iliac chain lymph nodes. \par 8) Cotesting 1/15/20 - Neg, HPV neg\par 9) CT A/P - ROZ\par \par \par

## 2021-12-20 NOTE — PHYSICAL EXAM
[Normal] : Cervix: Normal, no lesions [de-identified] : vagina slightly strictured and atrophic, consistent w/ age and radical vulvectomy

## 2021-12-20 NOTE — ASSESSMENT
[FreeTextEntry1] : ROZ today. S/S of recurrence discussed.\par \par [] Refer to nutrition\par [] Follow up in 6 months

## 2022-07-15 ENCOUNTER — APPOINTMENT (OUTPATIENT)
Dept: GYNECOLOGIC ONCOLOGY | Facility: CLINIC | Age: 67
End: 2022-07-15

## 2022-07-15 VITALS
HEART RATE: 83 BPM | DIASTOLIC BLOOD PRESSURE: 77 MMHG | RESPIRATION RATE: 18 BRPM | WEIGHT: 168 LBS | BODY MASS INDEX: 37.79 KG/M2 | HEIGHT: 56 IN | TEMPERATURE: 97.3 F | SYSTOLIC BLOOD PRESSURE: 139 MMHG | OXYGEN SATURATION: 97 %

## 2022-07-15 DIAGNOSIS — R35.0 FREQUENCY OF MICTURITION: ICD-10-CM

## 2022-07-15 PROCEDURE — 99215 OFFICE O/P EST HI 40 MIN: CPT

## 2022-07-15 NOTE — REASON FOR VISIT
[FreeTextEntry1] : Patient seen today for 6 month surveillance.  No acute complaints other than urine frequency. Pt denies dysuria. \par \par She reports vaginal dryness, which is a chronic complaint of hers. Replens recommended. We discussed the risks and benefits of estrogen. Risk of endometrial cancer\par \par She is requesting referral to GI for colonoscopy\par \par Last mammogram- 2 years ago

## 2022-07-15 NOTE — PHYSICAL EXAM
[Normal] : Adnexa(ae): Normal [de-identified] : vagina slightly strictured and atrophic, consistent w/ age and radical vulvectomy

## 2022-07-15 NOTE — ASSESSMENT
[FreeTextEntry1] : ROZ\par \par Recommendations given for vaginal dryness\par \par Referral given for mammogram and colonoscopy\par \par F/U in 6 months

## 2022-07-19 LAB
APPEARANCE: CLEAR
BILIRUBIN URINE: NEGATIVE
BLOOD URINE: NEGATIVE
COLOR: YELLOW
GLUCOSE QUALITATIVE U: NEGATIVE
KETONES URINE: NEGATIVE
LEUKOCYTE ESTERASE URINE: NEGATIVE
NITRITE URINE: NEGATIVE
PH URINE: 6.5
PROTEIN URINE: NORMAL
SPECIFIC GRAVITY URINE: 1.03
UROBILINOGEN URINE: NORMAL

## 2023-01-13 ENCOUNTER — APPOINTMENT (OUTPATIENT)
Dept: GYNECOLOGIC ONCOLOGY | Facility: CLINIC | Age: 68
End: 2023-01-13

## 2023-04-28 ENCOUNTER — APPOINTMENT (OUTPATIENT)
Dept: GYNECOLOGIC ONCOLOGY | Facility: CLINIC | Age: 68
End: 2023-04-28
Payer: COMMERCIAL

## 2023-04-28 ENCOUNTER — NON-APPOINTMENT (OUTPATIENT)
Age: 68
End: 2023-04-28

## 2023-04-28 VITALS
BODY MASS INDEX: 37.79 KG/M2 | WEIGHT: 168 LBS | HEIGHT: 56 IN | DIASTOLIC BLOOD PRESSURE: 85 MMHG | TEMPERATURE: 97.6 F | SYSTOLIC BLOOD PRESSURE: 156 MMHG | OXYGEN SATURATION: 96 % | HEART RATE: 102 BPM

## 2023-04-28 DIAGNOSIS — C51.9 MALIGNANT NEOPLASM OF VULVA, UNSPECIFIED: ICD-10-CM

## 2023-04-28 PROCEDURE — 99215 OFFICE O/P EST HI 40 MIN: CPT

## 2023-05-01 PROBLEM — C51.9 VULVAR CANCER, CARCINOMA: Status: ACTIVE | Noted: 2018-03-23

## 2023-05-01 NOTE — ASSESSMENT
[FreeTextEntry1] : ROZ\par \par S/S of recurrence discussed\par \par Surveillance schedule reviewed. She is now >5 years from treatment. NCCN recommendations are for annual follow up going forward. \par \par Importance of health maintenance discussed. I encouraged her to see her PCP regarding nutrition and weight loss options.\par \par Follow up in 1 year

## 2023-05-01 NOTE — REASON FOR VISIT
[FreeTextEntry1] : Patient seen today for overdue ( 9 months) surveillance. Pt feeling well. trouble losing weight and attempting. Walking is main exercise. Denies vaginal pruritus, burning, vb. \par \par We discussed nutrition consult and the important of more rigorous exercise than walking. \par \par \par Last mammogram- 11/2022- neg\par Colonoscopy: 11/2022- again in 5 years\par PAP: 1/2023: normal

## 2023-05-01 NOTE — HISTORY OF PRESENT ILLNESS
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 \par \par Previous Therapy\par 1)US 3/05/2018\par    a)Uterus 5.2x4.5x3.7cm\par    b)Endometrial stripe 0.92mm\par    c)Normal b/l ovaries \par 2)Vulvar Mass Bx 3/06/2018\par    a)Poorly differentiated mostly squamous cell carcinoma in situ suspicious for invasion \par 3)CTAP 3/21/2018\par    a)Uterus with fibroid of 0.8cm \par    b)Endometrial and endocervical canals are not distended, no parametrial or perivaginal mass lesions\par    c)Normal retroperitoneal, pelvic sidewall and groin LN\par 4)PET 4/03/2018\par    a)Hypermetabolic anterior vulva \par    b)No lymph node activity identified\par 5)Radical vulvectomy, bilateral sentinel lymph node biopsy with nuclear medicine 4/10/2018\par     a) Moderately diff squamous cell carcinoma of the vulva: FIGO stage II (T2 N0 M0) with >1 cm \par 6)Vulvar Bx 8/15/18\par    a)Benign squamous mucosa with acute granulation tissue negative for carcinoma \par 7) PET 12/12/2019 \par    a)No evidence of abnormal FDG activity to suggest biologic tumor activity, specifically within the bilateral inguinal and right external iliac chain lymph nodes. \par 8) Cotesting 1/15/20 - Neg, HPV neg\par 9) CT A/P 2020- ROZ\par \par \par

## 2023-05-01 NOTE — PHYSICAL EXAM
[Normal] : Recto-Vaginal Exam: Normal [de-identified] : vagina slightly strictured and atrophic, consistent w/ age and radical vulvectomy

## 2024-10-18 ENCOUNTER — LABORATORY RESULT (OUTPATIENT)
Age: 69
End: 2024-10-18

## 2024-10-18 ENCOUNTER — APPOINTMENT (OUTPATIENT)
Dept: GYNECOLOGIC ONCOLOGY | Facility: CLINIC | Age: 69
End: 2024-10-18

## 2024-10-18 ENCOUNTER — NON-APPOINTMENT (OUTPATIENT)
Age: 69
End: 2024-10-18

## 2024-10-18 VITALS
DIASTOLIC BLOOD PRESSURE: 83 MMHG | TEMPERATURE: 97.2 F | HEIGHT: 56 IN | HEART RATE: 96 BPM | OXYGEN SATURATION: 97 % | WEIGHT: 150 LBS | BODY MASS INDEX: 33.74 KG/M2 | SYSTOLIC BLOOD PRESSURE: 150 MMHG

## 2024-10-18 DIAGNOSIS — C51.9 MALIGNANT NEOPLASM OF VULVA, UNSPECIFIED: ICD-10-CM

## 2024-10-18 PROCEDURE — 99215 OFFICE O/P EST HI 40 MIN: CPT

## 2024-10-18 PROCEDURE — 99459 PELVIC EXAMINATION: CPT

## 2024-10-21 ENCOUNTER — NON-APPOINTMENT (OUTPATIENT)
Age: 69
End: 2024-10-21

## 2024-10-21 LAB
APPEARANCE: CLEAR
BILIRUBIN URINE: NEGATIVE
BLOOD URINE: ABNORMAL
COLOR: NORMAL
GLUCOSE QUALITATIVE U: NEGATIVE MG/DL
KETONES URINE: 15 MG/DL
LEUKOCYTE ESTERASE URINE: ABNORMAL
NITRITE URINE: NEGATIVE
PH URINE: 6
PROTEIN URINE: NORMAL MG/DL
SPECIFIC GRAVITY URINE: 1.03
UROBILINOGEN URINE: 1 MG/DL